# Patient Record
Sex: MALE | Race: WHITE | NOT HISPANIC OR LATINO | URBAN - METROPOLITAN AREA
[De-identification: names, ages, dates, MRNs, and addresses within clinical notes are randomized per-mention and may not be internally consistent; named-entity substitution may affect disease eponyms.]

---

## 2019-01-04 VITALS
DIASTOLIC BLOOD PRESSURE: 59 MMHG | HEART RATE: 79 BPM | OXYGEN SATURATION: 95 % | WEIGHT: 134.7 LBS | SYSTOLIC BLOOD PRESSURE: 106 MMHG | TEMPERATURE: 98 F | HEIGHT: 70 IN | RESPIRATION RATE: 18 BRPM

## 2019-01-04 NOTE — H&P ADULT - NSHPPHYSICALEXAM_GEN_ALL_CORE
MSK: + Decreased ROM 2/2 pain, lumbosacral spine      Remainder of exam per medical clearance note Gen:  Pt lethargic, A&OX3  MSK: + Decreased ROM 2/2 pain, lumbosacral spine  DP's/PT's +2  EHL/FHL/TA/Gastro 5/5  gross sensation to light touch intact throughout  b/l leg swelling R>L    Remainder of exam per medical clearance note Gen:  Pt lethargic, A&OX3  MSK: + Decreased ROM 2/2 pain, lumbosacral spine  DP's/PT's +2 b/l  EHL/FHL/TA/Gastro 5/5 b/l  gross sensation to light touch intact throughout lower extremities b/l  b/l leg swelling R>L    Remainder of exam per medical clearance note

## 2019-01-04 NOTE — H&P ADULT - PSH
Surgery, elective  clavical sx  Surgery, elective  left ankle 2012  Surgery, elective  spinal fusion x 2  lumbar

## 2019-01-04 NOTE — H&P ADULT - NSHPLABSRESULTS_GEN_ALL_CORE
Preop CBC, BMP, PT/PTT/INR, - WNL per medical clearance   UA DOS  Nasal swab DOS  Preop CXR - WNL per medical clearance   Preop EKG - NSR -  WNL per medical clearance

## 2019-01-04 NOTE — H&P ADULT - HISTORY OF PRESENT ILLNESS
53M c/o low back pain x     Pt has failed conservative treatment for his symptoms   Present for exploration of spinal fusion, revision TLIF L1-Sacrum 53M s/p Laminectomy fusion c/o low back pain x > 1 year.  Pt states back pain occasionally radiates down LE b/l but denies numbness or tingling down both lower extremities.  Pt currently takes 60mg MS Contin, 30mg oxycodone for his pain and states his pain is not well controlled.  Pt notes they have a hx of leg swelling which has been chronic in nature.  Pt ambulates independently.  Pt states they are lethargic today secondary to waking up early this morning for surgery.   Pt denies fever, chills, recent illness, CP, SOB, or being on any anticoagulation medication.    Pt has failed conservative treatment for his symptoms   Present for exploration of spinal fusion, revision TLIF L1-Sacrum with cage implantation. 53M s/p Laminectomy fusion c/o low back pain x > 1 year.  Pt states back pain occasionally radiates down LE b/l but denies numbness or tingling down both lower extremities.  Pt currently takes 60mg MS Contin, 30mg oxycodone for his pain and states his pain is not well controlled.  Pt notes they have a hx of leg swelling which has been chronic in nature.  Pt ambulates independently.  Pt states they are lethargic today secondary to waking up early this morning for surgery.   Pt denies fever, chills, recent illness, CP, SOB, or being on any anticoagulation medication.    Pt has failed conservative treatment for his symptoms   Presents for elective exploration of spinal fusion, revision TLIF L1-Sacrum with cage implantation.

## 2019-01-04 NOTE — PRE-OP CHECKLIST - 2.
pt appears sedated, wakes to voice, vs stable, pt stated took all pain meds this am..see MAR, anesthesia , Dr. Carlson, and OR nurse notified, no further instructions given.

## 2019-01-04 NOTE — H&P ADULT - PROBLEM SELECTOR PLAN 1
Admit to Orthopaedic Service.  Presents today for elective exploration of fusion, Revision TLIF L1-Sacrum  Pt medically stable and cleared for procedure today by Dr. Sweet Admit to Orthopaedic Service.  Presents today for elective exploration of fusion, Revision TLIF L1-Sacrum  Consult PM postop  Pt medically stable and cleared for procedure today by Dr. Sweet

## 2019-01-07 ENCOUNTER — INPATIENT (INPATIENT)
Facility: HOSPITAL | Age: 54
LOS: 7 days | Discharge: NURSING, REHAB OR LONG-TERM CARE | DRG: 457 | End: 2019-01-15
Payer: MEDICARE

## 2019-01-07 DIAGNOSIS — Z41.9 ENCOUNTER FOR PROCEDURE FOR PURPOSES OTHER THAN REMEDYING HEALTH STATE, UNSPECIFIED: Chronic | ICD-10-CM

## 2019-01-07 DIAGNOSIS — B19.20 UNSPECIFIED VIRAL HEPATITIS C WITHOUT HEPATIC COMA: ICD-10-CM

## 2019-01-07 DIAGNOSIS — G35 MULTIPLE SCLEROSIS: ICD-10-CM

## 2019-01-07 DIAGNOSIS — F32.9 MAJOR DEPRESSIVE DISORDER, SINGLE EPISODE, UNSPECIFIED: ICD-10-CM

## 2019-01-07 DIAGNOSIS — M48.07 SPINAL STENOSIS, LUMBOSACRAL REGION: ICD-10-CM

## 2019-01-07 LAB
MRSA PCR RESULT.: NEGATIVE — SIGNIFICANT CHANGE UP
S AUREUS DNA NOSE QL NAA+PROBE: POSITIVE

## 2019-01-07 PROCEDURE — 99222 1ST HOSP IP/OBS MODERATE 55: CPT

## 2019-01-07 RX ORDER — HYDROMORPHONE HYDROCHLORIDE 2 MG/ML
30 INJECTION INTRAMUSCULAR; INTRAVENOUS; SUBCUTANEOUS
Qty: 0 | Refills: 0 | Status: DISCONTINUED | OUTPATIENT
Start: 2019-01-07 | End: 2019-01-08

## 2019-01-07 RX ORDER — HYDROMORPHONE HYDROCHLORIDE 2 MG/ML
1 INJECTION INTRAMUSCULAR; INTRAVENOUS; SUBCUTANEOUS ONCE
Qty: 0 | Refills: 0 | Status: DISCONTINUED | OUTPATIENT
Start: 2019-01-07 | End: 2019-01-08

## 2019-01-07 RX ORDER — HYDROMORPHONE HYDROCHLORIDE 2 MG/ML
1 INJECTION INTRAMUSCULAR; INTRAVENOUS; SUBCUTANEOUS
Qty: 0 | Refills: 0 | Status: DISCONTINUED | OUTPATIENT
Start: 2019-01-07 | End: 2019-01-08

## 2019-01-07 RX ORDER — SODIUM CHLORIDE 9 MG/ML
1000 INJECTION INTRAMUSCULAR; INTRAVENOUS; SUBCUTANEOUS ONCE
Qty: 0 | Refills: 0 | Status: COMPLETED | OUTPATIENT
Start: 2019-01-07 | End: 2019-01-07

## 2019-01-07 RX ORDER — ACETAMINOPHEN 500 MG
975 TABLET ORAL EVERY 8 HOURS
Qty: 0 | Refills: 0 | Status: DISCONTINUED | OUTPATIENT
Start: 2019-01-07 | End: 2019-01-08

## 2019-01-07 RX ORDER — HYDROMORPHONE HYDROCHLORIDE 2 MG/ML
1 INJECTION INTRAMUSCULAR; INTRAVENOUS; SUBCUTANEOUS ONCE
Qty: 0 | Refills: 0 | Status: DISCONTINUED | OUTPATIENT
Start: 2019-01-07 | End: 2019-01-07

## 2019-01-07 RX ORDER — BUPIVACAINE 13.3 MG/ML
20 INJECTION, SUSPENSION, LIPOSOMAL INFILTRATION ONCE
Qty: 0 | Refills: 0 | Status: DISCONTINUED | OUTPATIENT
Start: 2019-01-07 | End: 2019-01-08

## 2019-01-07 RX ORDER — OXYCODONE HYDROCHLORIDE 5 MG/1
60 TABLET ORAL EVERY 8 HOURS
Qty: 0 | Refills: 0 | Status: DISCONTINUED | OUTPATIENT
Start: 2019-01-07 | End: 2019-01-07

## 2019-01-07 RX ORDER — NALOXONE HYDROCHLORIDE 4 MG/.1ML
0.1 SPRAY NASAL
Qty: 0 | Refills: 0 | Status: DISCONTINUED | OUTPATIENT
Start: 2019-01-07 | End: 2019-01-08

## 2019-01-07 RX ORDER — VANCOMYCIN HCL 1 G
1000 VIAL (EA) INTRAVENOUS EVERY 12 HOURS
Qty: 0 | Refills: 0 | Status: DISCONTINUED | OUTPATIENT
Start: 2019-01-07 | End: 2019-01-09

## 2019-01-07 RX ORDER — NALOXONE HYDROCHLORIDE 4 MG/.1ML
0.1 SPRAY NASAL
Qty: 0 | Refills: 0 | Status: DISCONTINUED | OUTPATIENT
Start: 2019-01-07 | End: 2019-01-07

## 2019-01-07 RX ORDER — ONDANSETRON 8 MG/1
4 TABLET, FILM COATED ORAL EVERY 6 HOURS
Qty: 0 | Refills: 0 | Status: DISCONTINUED | OUTPATIENT
Start: 2019-01-07 | End: 2019-01-08

## 2019-01-07 RX ORDER — ALPRAZOLAM 0.25 MG
0 TABLET ORAL
Qty: 0 | Refills: 0 | COMMUNITY

## 2019-01-07 RX ORDER — SODIUM CHLORIDE 9 MG/ML
1000 INJECTION, SOLUTION INTRAVENOUS
Qty: 0 | Refills: 0 | Status: DISCONTINUED | OUTPATIENT
Start: 2019-01-07 | End: 2019-01-09

## 2019-01-07 RX ORDER — HYDROMORPHONE HYDROCHLORIDE 2 MG/ML
1 INJECTION INTRAMUSCULAR; INTRAVENOUS; SUBCUTANEOUS
Qty: 0 | Refills: 0 | Status: DISCONTINUED | OUTPATIENT
Start: 2019-01-07 | End: 2019-01-07

## 2019-01-07 RX ORDER — ALPRAZOLAM 0.25 MG
2 TABLET ORAL
Qty: 0 | Refills: 0 | Status: DISCONTINUED | OUTPATIENT
Start: 2019-01-07 | End: 2019-01-08

## 2019-01-07 RX ORDER — OXYCODONE HYDROCHLORIDE 5 MG/1
30 TABLET ORAL EVERY 6 HOURS
Qty: 0 | Refills: 0 | Status: DISCONTINUED | OUTPATIENT
Start: 2019-01-07 | End: 2019-01-07

## 2019-01-07 RX ADMIN — Medication 975 MILLIGRAM(S): at 22:15

## 2019-01-07 RX ADMIN — Medication 250 MILLIGRAM(S): at 19:39

## 2019-01-07 RX ADMIN — Medication 975 MILLIGRAM(S): at 23:00

## 2019-01-07 RX ADMIN — SODIUM CHLORIDE 1000 MILLILITER(S): 9 INJECTION INTRAMUSCULAR; INTRAVENOUS; SUBCUTANEOUS at 23:38

## 2019-01-07 RX ADMIN — Medication 2 MILLIGRAM(S): at 22:16

## 2019-01-07 RX ADMIN — HYDROMORPHONE HYDROCHLORIDE 30 MILLILITER(S): 2 INJECTION INTRAMUSCULAR; INTRAVENOUS; SUBCUTANEOUS at 15:46

## 2019-01-07 RX ADMIN — HYDROMORPHONE HYDROCHLORIDE 1 MILLIGRAM(S): 2 INJECTION INTRAMUSCULAR; INTRAVENOUS; SUBCUTANEOUS at 14:47

## 2019-01-07 RX ADMIN — HYDROMORPHONE HYDROCHLORIDE 1 MILLIGRAM(S): 2 INJECTION INTRAMUSCULAR; INTRAVENOUS; SUBCUTANEOUS at 14:30

## 2019-01-07 RX ADMIN — HYDROMORPHONE HYDROCHLORIDE 1 MILLIGRAM(S): 2 INJECTION INTRAMUSCULAR; INTRAVENOUS; SUBCUTANEOUS at 14:45

## 2019-01-07 NOTE — DISCHARGE NOTE ADULT - MEDICATION SUMMARY - MEDICATIONS TO STOP TAKING
I will STOP taking the medications listed below when I get home from the hospital:    MS Contin 60 mg oral tablet, extended release  -- 1 tab(s) by mouth every 8 hours

## 2019-01-07 NOTE — DISCHARGE NOTE ADULT - PLAN OF CARE
Improve pain and function No strenuous activity, heavy lifting, driving or returning to work until cleared by MD.  You may shower - dressing is water-resistant, no soaking in bathtubs.  Remove dressing after 5 days, then leave incision open to air. Keep incision clean and dry.  Try to have regular bowel movements, take stool softener or laxative if necessary.  May take Pepcid or Zantac for upset stomach.  May take Aleve or Naproxen instead of Meloxicam.  Swelling may travel all the way down leg to foot, this is normal and will subside in a few weeks.  Call to schedule an appt with Dr. Carlson for follow up, if you have staples or sutures they will be removed in office.  Contact your doctor if you experience: fever greater than 101.5, chills, chest pain, difficulty breathing, redness or excessive drainage around the incision, other concerns. Wear lumbar support brace for ambulation  No strenuous activity (bending/twisting), heavy lifting, driving or returning to work until cleared by MD.  You may shower. Remove dressing daily before shower, pat the area dry gently then reapply dry gauze dressing x 5 days, then leave incision open to air.   Try to have regular bowel movements, take stool softener or laxative if necessary.  May take antacids for upset stomach.  Ice affected areas to decrease swelling.  Call to schedule an appt with Dr. Carlson for follow up, if you have staples or sutures they will be removed in office.  Contact your doctor if you experience: fever greater than 101.5, chills, chest pain, difficulty breathing, redness or excessive drainage around the incision, other concerns. Improved pain and lower extremity function Wear lumbar support brace for ambulation and will require supervision x2 assistance  Patient has MS and must follow up with outpatient neurologist  Patient is a chronic pain patient and must follow up with outpatient pain management Dr. Abreu Address: [address] [place] [id number] Phone: [phone]  No strenuous activity (bending/twisting), heavy lifting, driving or returning to work until cleared by MD.  You may shower. Remove dressing daily before shower, pat the area dry gently then reapply dry gauze dressing x 5 days, then leave incision open to air.   Try to have regular bowel movements, take stool softener or laxative if necessary.  May take antacids for upset stomach.  Ice affected areas to decrease swelling.  Call to schedule an appt with Dr. Carlson for follow up, if you have staples or sutures they will be removed in office.  Contact your doctor if you experience: fever greater than 101.5, chills, chest pain, difficulty breathing, redness or excessive drainage around the incision, other concerns.

## 2019-01-07 NOTE — CONSULT NOTE ADULT - SUBJECTIVE AND OBJECTIVE BOX
REASON FOR CONSULT:    HISTORY OF PRESENT ILLNESS:    PAST MEDICAL & SURGICAL HISTORY:  Depression  MS (multiple sclerosis)  Hepatitis C  Surgery, elective: clavical sx  Surgery, elective: left ankle 2012  Surgery, elective: spinal fusion x 2  lumbar      [ ] Diabetes   [ ] Hypertension  [ ] Hyperlipidemia  [ ] CAD  [ ] PCI  [ ] CABG    PREVIOUS DIAGNOSTIC TESTING:    [ ] Echocardiogram:  [ ]  Catheterization:  [ ] Stress Test:  	    MEDICATIONS:    vancomycin  IVPB 1000 milliGRAM(s) IV Intermittent every 12 hours      acetaminophen   Tablet .. 975 milliGRAM(s) Oral every 8 hours  ALPRAZolam 2 milliGRAM(s) Oral two times a day  HYDROmorphone  Injectable 1 milliGRAM(s) IV Push once PRN  HYDROmorphone  Injectable 1 milliGRAM(s) IV Push once PRN  HYDROmorphone  Injectable 1 milliGRAM(s) IV Push every 2 hours PRN  HYDROmorphone  Injectable 1 milliGRAM(s) IV Push once PRN  HYDROmorphone PCA (1 mG/mL) 30 milliLiter(s) PCA Continuous PCA Continuous  HYDROmorphone PCA (1 mG/mL) Rescue Clinician Bolus 1 milliGRAM(s) IV Push every 2 hours PRN  ondansetron Injectable 4 milliGRAM(s) IV Push every 6 hours PRN        lactated ringers. 1000 milliLiter(s) IV Continuous <Continuous>      FAMILY HISTORY:      SOCIAL HISTORY:    [ x] Non-smoker  [ ] Smoker  [ ] Alcohol    FAMILY HX: [place]    Allergies    No Known Allergies    Intolerances    erythromycin (Stomach Upset)  	    REVIEW OF SYSTEMS:    [x] as per HPI  CONSTITUTIONAL: No fever, weight loss, or fatigue  ENT:  No difficulty hearing, tinnitus, vertigo; No sinus or throat pain  RESPIRATORY: No cough, wheezing, chills or hemoptysis; No Shortness of Breath  CARDIOVASCULAR: No chest pain, palpitations, dizziness, or leg swelling  GASTROINTESTINAL: No abdominal or epigastric pain. No nausea, vomiting, or hematemesis; No diarrhea or constipation. No melena or hematochezia.  GENITOURINARY: No dysuria, frequency, hematuria, or incontinence  NEUROLOGICAL: No headaches, memory loss, loss of strength, numbness, or tremors  MUSCULOSKELETAL: No joint pain or swelling; No muscle, back, or extremity pain  [x] All others negative	  [ ] Unable to obtain    PHYSICAL EXAM:  T(C): 35.8 (19 @ 13:56), Max: 35.8 (19 @ 13:56)  HR: 104 (19 @ 15:56) (80 - 104)  BP: 109/82 (19 @ 14:11) (109/82 - 134/91)  RR: 9 (19 @ 15:56) (7 - 43)  SpO2: 98% (19 @ 15:56) (85% - 100%)  Wt(kg): --  I&O's Summary      Appearance: Normal	  HEENT:   Normal oral mucosa, PERRL, EOMI	  Lymphatic: No lymphadenopathy  Cardiovascular: Normal S1 S2, No JVD, No murmurs, No edema  Respiratory: Lungs clear to auscultation	  Psychiatry: A & O x 3, Mood & affect appropriate  Gastrointestinal:  Soft, Non-tender, + BS	  Skin: No rashes, No ecchymoses, No cyanosis	  Neurologic: Non-focal  Extremities: Normal range of motion, No clubbing, cyanosis or edema  Vascular: Peripheral pulses palpable 2+ bilaterally    TELEMETRY: 	    ECG:    ECHO:   STRESS:  CATH:  	  RADIOLOGY:  CXR:  CT:  US:   	  	  LABS:	 	    CARDIAC MARKERS:                    proBNP:   Lipid Profile:   HgA1c:   TSH:     ASSESSMENT/PLAN: 	    #r/o endocarditis - will base Rx on modified dukes criteria  Hep C, IVDU  please obtain the followin sets of blood cultures under sterile technique  CBC with manual differential  ESR, CRP  12 lead ECG  Echo - transthoracic ( no need for KRISTINE yet)  ID consultation

## 2019-01-07 NOTE — DISCHARGE NOTE ADULT - CARE PLAN
Principal Discharge DX:	Spinal stenosis of lumbosacral region  Goal:	Improve pain and function  Assessment and plan of treatment:	No strenuous activity, heavy lifting, driving or returning to work until cleared by MD.  You may shower - dressing is water-resistant, no soaking in bathtubs.  Remove dressing after 5 days, then leave incision open to air. Keep incision clean and dry.  Try to have regular bowel movements, take stool softener or laxative if necessary.  May take Pepcid or Zantac for upset stomach.  May take Aleve or Naproxen instead of Meloxicam.  Swelling may travel all the way down leg to foot, this is normal and will subside in a few weeks.  Call to schedule an appt with Dr. Carlson for follow up, if you have staples or sutures they will be removed in office.  Contact your doctor if you experience: fever greater than 101.5, chills, chest pain, difficulty breathing, redness or excessive drainage around the incision, other concerns. Principal Discharge DX:	Spinal stenosis of lumbosacral region  Goal:	Improve pain and function  Assessment and plan of treatment:	Wear lumbar support brace for ambulation  No strenuous activity (bending/twisting), heavy lifting, driving or returning to work until cleared by MD.  You may shower. Remove dressing daily before shower, pat the area dry gently then reapply dry gauze dressing x 5 days, then leave incision open to air.   Try to have regular bowel movements, take stool softener or laxative if necessary.  May take antacids for upset stomach.  Ice affected areas to decrease swelling.  Call to schedule an appt with Dr. Carlson for follow up, if you have staples or sutures they will be removed in office.  Contact your doctor if you experience: fever greater than 101.5, chills, chest pain, difficulty breathing, redness or excessive drainage around the incision, other concerns. Principal Discharge DX:	Spinal stenosis of lumbosacral region  Goal:	Improved pain and lower extremity function  Assessment and plan of treatment:	Wear lumbar support brace for ambulation and will require supervision x2 assistance  Patient has MS and must follow up with outpatient neurologist  Patient is a chronic pain patient and must follow up with outpatient pain management Dr. Abreu Address: [facility] [address] [place], [place] [address] Phone: [phone]  No strenuous activity (bending/twisting), heavy lifting, driving or returning to work until cleared by MD.  You may shower. Remove dressing daily before shower, pat the area dry gently then reapply dry gauze dressing x 5 days, then leave incision open to air.   Try to have regular bowel movements, take stool softener or laxative if necessary.  May take antacids for upset stomach.  Ice affected areas to decrease swelling.  Call to schedule an appt with Dr. Carlson for follow up, if you have staples or sutures they will be removed in office.  Contact your doctor if you experience: fever greater than 101.5, chills, chest pain, difficulty breathing, redness or excessive drainage around the incision, other concerns.

## 2019-01-07 NOTE — BRIEF OPERATIVE NOTE - POST-OP DX
Other kyphosis of thoracolumbar region  01/07/2019    Active  Isrrael, Mikail  Spinal fusion failure, sequela  01/07/2019    Active  Isrrael, Mikail  Spinal stenosis of lumbar region, unspecified whether neurogenic claudication present  01/07/2019    Active  Isrrael, Mikail

## 2019-01-07 NOTE — PROGRESS NOTE ADULT - SUBJECTIVE AND OBJECTIVE BOX
[facility] [phone]    Chief Complaint: Lower Back Pain    HPI: Patient seen and examined in PACU. Patient laying down in bed, complaining of lower back and incisional pain. Patient sees Dr. Roosevelt Abreu and takes Oxycontin 60mg PO TID, Oxycodone 30mg PO 4-5 times a day prn pain and Xanax 2mg PO BID, verified with [facility] pharmacy. Discussed plan to start Dilaudid PCA, reviewed Dilaudid PCA        Pain is ___ sharp _x___dull ___burning _x__achy ___ Intensity: ____ mild _x__mod _x__severe     Location _x___surgical site ____cervical __x___lumbar ____abd ____upper ext____lower ext    Worse with __x__activity __x__movement _____physical therapy___ Rest    Improved with __x__medication __x__rest ____physical therapy      ROS: Const:  _-__febrile   Eyes:___ENT:___CV: _-__chest pain  Resp: __-__sob  GI:_-__nausea _-__vomiting _-__abd pain ___npo ___clears x__full diet __bm  :___ Musk: _x__pain _x__spasm  Skin:___ Neuro:  _-__ubtxwrbp_-__tsffakplq_-__ numbness _x__weakness _-__paresth  Psych:-__anxiety  Endo:___ Heme:___Allergy:_________, _x__all others reviewed and negative      PAST MEDICAL & SURGICAL HISTORY:  Depression  MS (multiple sclerosis)  Hepatitis C  Surgery, elective: clavical sx  Surgery, elective: left ankle 2012  Surgery, elective: spinal fusion x 2  lumbar  Depression  COPD (chronic obstructive pulmonary disease)  MS (multiple sclerosis)  Hepatitis C  Spinal stenosis of lumbar region, unspecified whether neurogenic claudication present  Spinal fusion failure, sequela  Other kyphosis of thoracolumbar region  Spinal fusion failure, sequela  Spinal stenosis of lumbar region, unspecified whether neurogenic claudication present  Other kyphosis of thoracolumbar region  Laminectomy of lumbar spine 3 or more levels with exploration or decompression or both  Spinal stenosis of lumbosacral region  MS (multiple sclerosis)  Depression  Hepatitis C  Spinal stenosis of lumbosacral region  Laminectomy of lumbar spine 3 or more levels with exploration or decompression or both  Surgery, elective    SH: _-__Tobacco   __-_Alcohol                          FH:FAMILY HISTORY:      acetaminophen   Tablet .. 975 milliGRAM(s) Oral every 8 hours  ALPRAZolam 2 milliGRAM(s) Oral two times a day  BUpivacaine liposome 1.3% Injectable (no eMAR) 20 milliLiter(s) Local Injection once  HYDROmorphone  Injectable 1 milliGRAM(s) IV Push once PRN  HYDROmorphone  Injectable 1 milliGRAM(s) IV Push once PRN  HYDROmorphone  Injectable 1 milliGRAM(s) IV Push every 2 hours PRN  HYDROmorphone  Injectable 1 milliGRAM(s) IV Push once PRN  HYDROmorphone PCA (1 mG/mL) 30 milliLiter(s) PCA Continuous PCA Continuous  HYDROmorphone PCA (1 mG/mL) Rescue Clinician Bolus 1 milliGRAM(s) IV Push every 2 hours PRN  lactated ringers. 1000 milliLiter(s) IV Continuous <Continuous>  naloxone Injectable 0.1 milliGRAM(s) IV Push every 3 minutes PRN  ondansetron Injectable 4 milliGRAM(s) IV Push every 6 hours PRN  vancomycin  IVPB 1000 milliGRAM(s) IV Intermittent every 12 hours      T(C): 35.8 (01-07-19 @ 13:56), Max: 35.8 (01-07-19 @ 13:56)  HR: 100 (01-07-19 @ 14:56) (80 - 100)  BP: 109/82 (01-07-19 @ 14:11) (109/82 - 134/91)  RR: 43 (01-07-19 @ 14:56) (7 - 43)  SpO2: 94% (01-07-19 @ 14:56) (85% - 100%)  Wt(kg): --    T(C): 35.8 (01-07-19 @ 13:56), Max: 35.8 (01-07-19 @ 13:56)  HR: 100 (01-07-19 @ 14:56) (80 - 100)  BP: 109/82 (01-07-19 @ 14:11) (109/82 - 134/91)  RR: 43 (01-07-19 @ 14:56) (7 - 43)  SpO2: 94% (01-07-19 @ 14:56) (85% - 100%)  Wt(kg): --    T(C): 35.8 (01-07-19 @ 13:56), Max: 35.8 (01-07-19 @ 13:56)  HR: 100 (01-07-19 @ 14:56) (80 - 100)  BP: 109/82 (01-07-19 @ 14:11) (109/82 - 134/91)  RR: 43 (01-07-19 @ 14:56) (7 - 43)  SpO2: 94% (01-07-19 @ 14:56) (85% - 100%)  Wt(kg): --      PHYSICAL EXAM:  Gen Appearance: _x__no acute distress __x_appropriate        Neuro: ___SILT feet____ EOM Intact Psych: AAOX_3_, _x__mood/affect appropriate        Eyes: _x__conjunctiva WNL  __x___ Pupils equal and round        ENT: __x_ears and nose atraumatic_x__ Hearing grossly intact        Neck: _x__trachea midline, no visible masses ___thyroid without palpable mass    Resp: _x__Nml WOB____No tactile fremitus ___clear to auscultation    Cardio: _x__extremities free from edema __x__pedal pulses palpable    GI/Abdomen: _x__soft __x___ Nontender____x__Nondistended_____HSM    Lymphatic: ___no palpable nodes in neck  ___no palpable nodes calves and feet    Skin/Wound: _x__Incision, _x__Dressing c/d/i,   _x___surrounding tissues soft,  ___drain/chest tube present____    Muscular: EHL _4__/5  Gastrocnemius_4__/5    ___absent clubbing/cyanosis        ASSESSMENT: This is a 53y old Male with a history of spinal stenosis and spinal fusion, s/p laminectomy fusion, post op day 0.       Recommended Treatment PLAN:  1. Dilaudid PCA 0.3mg, Q6 minutes 16mg 4 hour max   2. Dilaudid 1mg Q1H IVP prn breakthrough pain   3. Tylenol 975mg PO Q8h standing  4. Consider escalating Dilaudid PCA 0.4mg demand dose if necessary  Plan discussed with Vicente Feliz Pain Management Consult [facility] [phone]    Chief Complaint: Lower Back Pain    HPI: Patient seen and examined in PACU. Patient laying down in bed, complaining of lower back and incisional pain. Patient sees Dr. Roosevelt Abreu and takes Oxycontin 60mg PO TID, Oxycodone 30mg PO 4-5 times a day prn pain and Xanax 2mg PO BID, verified with [facility] pharmacy and [facility]. Discussed plan to start Dilaudid PCA postop, reviewed Dilaudid PCA use with patient, Patient verbalized understanding.         Pain is ___ sharp _x___dull ___burning _x__achy ___ Intensity: ____ mild _x__mod _x__severe     Location _x___surgical site ____cervical __x___lumbar ____abd ____upper ext____lower ext    Worse with __x__activity __x__movement _____physical therapy___ Rest    Improved with __x__medication __x__rest ____physical therapy      ROS: Const:  _-__febrile   Eyes:___ENT:___CV: _-__chest pain  Resp: __-__sob  GI:_-__nausea _-__vomiting _-__abd pain ___npo ___clears x__full diet __bm  :___ Musk: _x__pain _x__spasm  Skin:___ Neuro:  _-__pmzclnjg_-__hcaqafswz_-__ numbness _x__weakness _-__paresth  Psych:-__anxiety  Endo:___ Heme:___Allergy:_________, _x__all others reviewed and negative      PAST MEDICAL & SURGICAL HISTORY:  Depression  MS (multiple sclerosis)  Hepatitis C  Surgery, elective: clavical sx  Surgery, elective: left ankle 2012  Surgery, elective: spinal fusion x 2  lumbar  Depression  COPD (chronic obstructive pulmonary disease)  MS (multiple sclerosis)  Hepatitis C  Spinal stenosis of lumbar region, unspecified whether neurogenic claudication present  Spinal fusion failure, sequela  Other kyphosis of thoracolumbar region  Spinal fusion failure, sequela  Spinal stenosis of lumbar region, unspecified whether neurogenic claudication present  Other kyphosis of thoracolumbar region  Laminectomy of lumbar spine 3 or more levels with exploration or decompression or both  Spinal stenosis of lumbosacral region  MS (multiple sclerosis)  Depression  Hepatitis C  Spinal stenosis of lumbosacral region  Laminectomy of lumbar spine 3 or more levels with exploration or decompression or both  Surgery, elective    SH: _-__Tobacco   __-_Alcohol                          FH:FAMILY HISTORY:      acetaminophen   Tablet .. 975 milliGRAM(s) Oral every 8 hours  ALPRAZolam 2 milliGRAM(s) Oral two times a day  BUpivacaine liposome 1.3% Injectable (no eMAR) 20 milliLiter(s) Local Injection once  HYDROmorphone  Injectable 1 milliGRAM(s) IV Push once PRN  HYDROmorphone  Injectable 1 milliGRAM(s) IV Push once PRN  HYDROmorphone  Injectable 1 milliGRAM(s) IV Push every 2 hours PRN  HYDROmorphone  Injectable 1 milliGRAM(s) IV Push once PRN  HYDROmorphone PCA (1 mG/mL) 30 milliLiter(s) PCA Continuous PCA Continuous  HYDROmorphone PCA (1 mG/mL) Rescue Clinician Bolus 1 milliGRAM(s) IV Push every 2 hours PRN  lactated ringers. 1000 milliLiter(s) IV Continuous <Continuous>  naloxone Injectable 0.1 milliGRAM(s) IV Push every 3 minutes PRN  ondansetron Injectable 4 milliGRAM(s) IV Push every 6 hours PRN  vancomycin  IVPB 1000 milliGRAM(s) IV Intermittent every 12 hours      T(C): 35.8 (01-07-19 @ 13:56), Max: 35.8 (01-07-19 @ 13:56)  HR: 100 (01-07-19 @ 14:56) (80 - 100)  BP: 109/82 (01-07-19 @ 14:11) (109/82 - 134/91)  RR: 43 (01-07-19 @ 14:56) (7 - 43)  SpO2: 94% (01-07-19 @ 14:56) (85% - 100%)  Wt(kg): --    T(C): 35.8 (01-07-19 @ 13:56), Max: 35.8 (01-07-19 @ 13:56)  HR: 100 (01-07-19 @ 14:56) (80 - 100)  BP: 109/82 (01-07-19 @ 14:11) (109/82 - 134/91)  RR: 43 (01-07-19 @ 14:56) (7 - 43)  SpO2: 94% (01-07-19 @ 14:56) (85% - 100%)  Wt(kg): --    T(C): 35.8 (01-07-19 @ 13:56), Max: 35.8 (01-07-19 @ 13:56)  HR: 100 (01-07-19 @ 14:56) (80 - 100)  BP: 109/82 (01-07-19 @ 14:11) (109/82 - 134/91)  RR: 43 (01-07-19 @ 14:56) (7 - 43)  SpO2: 94% (01-07-19 @ 14:56) (85% - 100%)  Wt(kg): --      PHYSICAL EXAM:  Gen Appearance: _x__no acute distress __x_appropriate        Neuro: ___SILT feet____ EOM Intact Psych: AAOX_3_, _x__mood/affect appropriate        Eyes: _x__conjunctiva WNL  __x___ Pupils equal and round        ENT: __x_ears and nose atraumatic_x__ Hearing grossly intact        Neck: _x__trachea midline, no visible masses ___thyroid without palpable mass    Resp: _x__Nml WOB____No tactile fremitus ___clear to auscultation    Cardio: _x__extremities free from edema __x__pedal pulses palpable    GI/Abdomen: _x__soft __x___ Nontender____x__Nondistended_____HSM    Lymphatic: ___no palpable nodes in neck  ___no palpable nodes calves and feet    Skin/Wound: _x__Incision, _x__Dressing c/d/i,   _x___surrounding tissues soft,  ___drain/chest tube present____    Muscular: EHL _4__/5  Gastrocnemius_4__/5    ___absent clubbing/cyanosis        ASSESSMENT: This is a 53y old Male with a history of spinal stenosis and spinal fusion, s/p laminectomy fusion, post op day 0.       Recommended Treatment PLAN:  1. Dilaudid PCA 0.3mg, Q6 minutes 16mg 4 hour max   2. Dilaudid 1mg Q1H IVP prn breakthrough pain   3. Tylenol 975mg PO Q8h standing  4. Consider escalating Dilaudid PCA 0.4mg demand dose if necessary  Plan discussed with Vicente Feliz

## 2019-01-07 NOTE — DISCHARGE NOTE ADULT - PATIENT PORTAL LINK FT
You can access the [email or web address] Patient Portal, offered by [facility], by registering with the following website: [email or web address]

## 2019-01-07 NOTE — DISCHARGE NOTE ADULT - REASON FOR ADMISSION
low back pain low back pain/ OR 1/8/19: Removal of hardware, exploration of fusion, revision laminectomy, primary laminectomy L2-L4, re-instrumentation of posterior spinal fusion L1-sacrum

## 2019-01-07 NOTE — BRIEF OPERATIVE NOTE - PROCEDURE
<<-----Click on this checkbox to enter Procedure Laminectomy of lumbar spine 3 or more levels with exploration or decompression or both  01/07/2019  Removal of hardware, exploration of fusion, revision laminectomy, primary laminectomy L2-L4, reinstrumentation of posterior spinal fusion L1-sacrum  Active  MKOROMA

## 2019-01-07 NOTE — PROGRESS NOTE ADULT - SUBJECTIVE AND OBJECTIVE BOX
Orthopaedics Post Op Check    Procedure: Revision TLIF L1-Sacrum  Surgeon: Dr. Carlson    Pt agitated. Complaining of pain unrelieved by medications and position of his bed. Pt insists that he sit in a chair while PACU staff continues to encourage that this is not a safe option at this time. Pain management at the bedside - patient has required multiple doses of dilaudid in the PACU while waiting for PCA. Patient home dose of xanax to be ordered which has been approved by Dr. Carlson.     Denies CP, SOB, N/V, numbness/tingling       AVSS    Dressing C/D/I; HV x 2   General: Pt Alert and oriented     Pulses: DP pulses palpable   Sensation: SLT in tact and equal to distal bilateral lower extremities   Motor: EHL/FHL/TA/GS 5/5 motor strength bilaterally   Right lower extremity swelling globally      Post op XR: fluoroscopy utilized intra operatively to confirm level    A/P: 53yMale POD#0 s/p Revision TLIF L1-Sacrum  - Stable  - Pain Control  - DVT ppx: SCDs  - Post op abx: Vanco standing (until Cardiology consult)  - PT, WBS: WBAT  - F/U AM Labs  - Pain management recs appreciated   - F/U Bilateral lower extremity dopplers

## 2019-01-07 NOTE — DISCHARGE NOTE ADULT - MEDICATION SUMMARY - MEDICATIONS TO TAKE
I will START or STAY ON the medications listed below when I get home from the hospital:    oxyCODONE 30 mg oral tablet  -- 1 tab(s) by mouth every 4 hours, As needed, Severe Pain (7 - 10)  -- Indication: For Short acting pain control (chronic pain patient recommended by pain mngt MD)    oxyCODONE 80 mg oral tablet, extended release  -- 1 tab(s) by mouth every 8 hours  -- Indication: For long acting pain control (chronic pain patient recommended by pain mngt MD)    Xanax 2 mg oral tablet  -- orally 2 times a day  -- Indication: For Home medication    famotidine 20 mg oral tablet  -- 1 tab(s) by mouth once a day  -- Indication: For gastrointestinal agent    bisacodyl 5 mg oral delayed release tablet  -- 1 tab(s) by mouth every 12 hours, As needed, Constipation  -- Indication: For constipation    docusate sodium 100 mg oral capsule  -- 1 cap(s) by mouth 3 times a day  -- Indication: For constipation    polyethylene glycol 3350 oral powder for reconstitution  -- 17 gram(s) by mouth once a day  -- Indication: For constipation    senna oral tablet  -- 2 tab(s) by mouth once a day (at bedtime)  -- Indication: For constipation    nicotine 7 mg/24 hr transdermal film, extended release  --  by transdermal patch one patch daily  -- Indication: For Smoking cessation

## 2019-01-07 NOTE — DISCHARGE NOTE ADULT - HOSPITAL COURSE
Admit to Orthopedics   OR for Revision TLIF L1-pelvis   Periop Antibx  DVT ppx- SCDs  PT / WBAT   Pain mgt consult for pain control

## 2019-01-07 NOTE — DISCHARGE NOTE ADULT - CARE PROVIDER_API CALL
Sagar Carlson), Orthopaedic Surgery  [address]  [address] [place] [address]  Phone: [phone]  Fax: [phone]

## 2019-01-07 NOTE — DISCHARGE NOTE ADULT - ADDITIONAL INSTRUCTIONS
Follow up with your primary care provider Follow up with your primary care provider, pain management provider Dr. Abreu and neurologist for MS management

## 2019-01-07 NOTE — DISCHARGE NOTE ADULT - THE PATIENT HAS
difficulty decision making difficulty decision making/no difficulties no difficulties/difficulty concentrating

## 2019-01-07 NOTE — CONSULT NOTE ADULT - SUBJECTIVE AND OBJECTIVE BOX
Pain Management Consult Note - [facility] [phone]    Chief Complaint: Lower Back Pain    HPI: Patient seen and examined in PACU. Patient laying down in bed, complaining of lower back and incisional pain. Patient sees Dr. Roosevelt Abreu and takes Oxycontin 60mg PO TID, Oxycodone 30mg PO 4-5 times a day prn pain and Xanax 2mg PO BID, verified with [facility] pharmacy and [facility]. Discussed plan to start Dilaudid PCA postop, reviewed Dilaudid PCA use with patient, Patient verbalized understanding.         Pain is ___ sharp _x___dull ___burning _x__achy ___ Intensity: ____ mild _x__mod _x__severe     Location _x___surgical site ____cervical __x___lumbar ____abd ____upper ext____lower ext    Worse with __x__activity __x__movement _____physical therapy___ Rest    Improved with __x__medication __x__rest ____physical therapy      ROS: Const:  _-__febrile   Eyes:___ENT:___CV: _-__chest pain  Resp: __-__sob  GI:_-__nausea _-__vomiting _-__abd pain ___npo ___clears x__full diet __bm  :___ Musk: _x__pain _x__spasm  Skin:___ Neuro:  _-__ubotovct_-__qjbtasxxm_-__ numbness _x__weakness _-__paresth  Psych:-__anxiety  Endo:___ Heme:___Allergy:_________, _x__all others reviewed and negative      PAST MEDICAL & SURGICAL HISTORY:  Depression  MS (multiple sclerosis)  Hepatitis C  Surgery, elective: clavical sx  Surgery, elective: left ankle 2012  Surgery, elective: spinal fusion x 2  lumbar  Depression  COPD (chronic obstructive pulmonary disease)  MS (multiple sclerosis)  Hepatitis C  Spinal stenosis of lumbar region, unspecified whether neurogenic claudication present  Spinal fusion failure, sequela  Other kyphosis of thoracolumbar region  Spinal fusion failure, sequela  Spinal stenosis of lumbar region, unspecified whether neurogenic claudication present  Other kyphosis of thoracolumbar region  Laminectomy of lumbar spine 3 or more levels with exploration or decompression or both  Spinal stenosis of lumbosacral region  MS (multiple sclerosis)  Depression  Hepatitis C  Spinal stenosis of lumbosacral region  Laminectomy of lumbar spine 3 or more levels with exploration or decompression or both  Surgery, elective    SH: _-__Tobacco   __-_Alcohol                          FH:FAMILY HISTORY:      acetaminophen   Tablet .. 975 milliGRAM(s) Oral every 8 hours  ALPRAZolam 2 milliGRAM(s) Oral two times a day  BUpivacaine liposome 1.3% Injectable (no eMAR) 20 milliLiter(s) Local Injection once  HYDROmorphone  Injectable 1 milliGRAM(s) IV Push once PRN  HYDROmorphone  Injectable 1 milliGRAM(s) IV Push once PRN  HYDROmorphone  Injectable 1 milliGRAM(s) IV Push every 2 hours PRN  HYDROmorphone  Injectable 1 milliGRAM(s) IV Push once PRN  HYDROmorphone PCA (1 mG/mL) 30 milliLiter(s) PCA Continuous PCA Continuous  HYDROmorphone PCA (1 mG/mL) Rescue Clinician Bolus 1 milliGRAM(s) IV Push every 2 hours PRN  lactated ringers. 1000 milliLiter(s) IV Continuous <Continuous>  naloxone Injectable 0.1 milliGRAM(s) IV Push every 3 minutes PRN  ondansetron Injectable 4 milliGRAM(s) IV Push every 6 hours PRN  vancomycin  IVPB 1000 milliGRAM(s) IV Intermittent every 12 hours      T(C): 35.8 (01-07-19 @ 13:56), Max: 35.8 (01-07-19 @ 13:56)  HR: 100 (01-07-19 @ 14:56) (80 - 100)  BP: 109/82 (01-07-19 @ 14:11) (109/82 - 134/91)  RR: 43 (01-07-19 @ 14:56) (7 - 43)  SpO2: 94% (01-07-19 @ 14:56) (85% - 100%)  Wt(kg): --    T(C): 35.8 (01-07-19 @ 13:56), Max: 35.8 (01-07-19 @ 13:56)  HR: 100 (01-07-19 @ 14:56) (80 - 100)  BP: 109/82 (01-07-19 @ 14:11) (109/82 - 134/91)  RR: 43 (01-07-19 @ 14:56) (7 - 43)  SpO2: 94% (01-07-19 @ 14:56) (85% - 100%)  Wt(kg): --    T(C): 35.8 (01-07-19 @ 13:56), Max: 35.8 (01-07-19 @ 13:56)  HR: 100 (01-07-19 @ 14:56) (80 - 100)  BP: 109/82 (01-07-19 @ 14:11) (109/82 - 134/91)  RR: 43 (01-07-19 @ 14:56) (7 - 43)  SpO2: 94% (01-07-19 @ 14:56) (85% - 100%)  Wt(kg): --      PHYSICAL EXAM:  Gen Appearance: _x__no acute distress __x_appropriate        Neuro: ___SILT feet____ EOM Intact Psych: AAOX_3_, _x__mood/affect appropriate        Eyes: _x__conjunctiva WNL  __x___ Pupils equal and round        ENT: __x_ears and nose atraumatic_x__ Hearing grossly intact        Neck: _x__trachea midline, no visible masses ___thyroid without palpable mass    Resp: _x__Nml WOB____No tactile fremitus ___clear to auscultation    Cardio: _x__extremities free from edema __x__pedal pulses palpable    GI/Abdomen: _x__soft __x___ Nontender____x__Nondistended_____HSM    Lymphatic: ___no palpable nodes in neck  ___no palpable nodes calves and feet    Skin/Wound: _x__Incision, _x__Dressing c/d/i,   _x___surrounding tissues soft,  ___drain/chest tube present____    Muscular: EHL _4__/5  Gastrocnemius_4__/5    ___absent clubbing/cyanosis        ASSESSMENT: This is a 53y old Male with a history of spinal stenosis and spinal fusion, s/p laminectomy fusion, post op day 0.       Recommended Treatment PLAN:  1. Dilaudid PCA 0.3mg, Q6 minutes 16mg 4 hour max   2. Dilaudid 1mg Q1H IVP prn breakthrough pain   3. Tylenol 975mg PO Q8h standing  4. Consider escalating Dilaudid PCA 0.4mg demand dose if necessary  Plan discussed with Vicente Feliz

## 2019-01-07 NOTE — DISCHARGE NOTE ADULT - NS AS ACTIVITY OBS
Do not drive or operate machinery/No Heavy lifting/straining/Showering allowed Showering allowed/No Heavy lifting/straining/Do not make important decisions/Do not drive or operate machinery

## 2019-01-08 LAB
ALBUMIN SERPL ELPH-MCNC: 2.8 G/DL — LOW (ref 3.3–5)
ALP SERPL-CCNC: 52 U/L — SIGNIFICANT CHANGE UP (ref 40–120)
ALT FLD-CCNC: 20 U/L — SIGNIFICANT CHANGE UP (ref 10–45)
ANION GAP SERPL CALC-SCNC: 9 MMOL/L — SIGNIFICANT CHANGE UP (ref 5–17)
ANION GAP SERPL CALC-SCNC: 9 MMOL/L — SIGNIFICANT CHANGE UP (ref 5–17)
APPEARANCE UR: CLEAR — SIGNIFICANT CHANGE UP
AST SERPL-CCNC: 27 U/L — SIGNIFICANT CHANGE UP (ref 10–40)
BASE EXCESS BLDA CALC-SCNC: 2.6 MMOL/L — SIGNIFICANT CHANGE UP (ref -2–3)
BASOPHILS NFR BLD AUTO: 0.3 % — SIGNIFICANT CHANGE UP (ref 0–2)
BILIRUB SERPL-MCNC: 0.7 MG/DL — SIGNIFICANT CHANGE UP (ref 0.2–1.2)
BILIRUB UR-MCNC: NEGATIVE — SIGNIFICANT CHANGE UP
BLD GP AB SCN SERPL QL: NEGATIVE — SIGNIFICANT CHANGE UP
BLD GP AB SCN SERPL QL: NEGATIVE — SIGNIFICANT CHANGE UP
BUN SERPL-MCNC: 7 MG/DL — SIGNIFICANT CHANGE UP (ref 7–23)
BUN SERPL-MCNC: 7 MG/DL — SIGNIFICANT CHANGE UP (ref 7–23)
CALCIUM SERPL-MCNC: 7.7 MG/DL — LOW (ref 8.4–10.5)
CALCIUM SERPL-MCNC: 8.1 MG/DL — LOW (ref 8.4–10.5)
CHLORIDE SERPL-SCNC: 103 MMOL/L — SIGNIFICANT CHANGE UP (ref 96–108)
CHLORIDE SERPL-SCNC: 103 MMOL/L — SIGNIFICANT CHANGE UP (ref 96–108)
CK MB CFR SERPL CALC: 13.1 NG/ML — HIGH (ref 0–6.7)
CK SERPL-CCNC: 1092 U/L — HIGH (ref 30–200)
CO2 SERPL-SCNC: 24 MMOL/L — SIGNIFICANT CHANGE UP (ref 22–31)
CO2 SERPL-SCNC: 27 MMOL/L — SIGNIFICANT CHANGE UP (ref 22–31)
COLOR SPEC: YELLOW — SIGNIFICANT CHANGE UP
CREAT SERPL-MCNC: 0.69 MG/DL — SIGNIFICANT CHANGE UP (ref 0.5–1.3)
CREAT SERPL-MCNC: 0.76 MG/DL — SIGNIFICANT CHANGE UP (ref 0.5–1.3)
DIFF PNL FLD: ABNORMAL
EOSINOPHIL NFR BLD AUTO: 1 % — SIGNIFICANT CHANGE UP (ref 0–6)
EXTRA SST TUBE: SIGNIFICANT CHANGE UP
GLUCOSE BLDC GLUCOMTR-MCNC: 115 MG/DL — HIGH (ref 70–99)
GLUCOSE SERPL-MCNC: 118 MG/DL — HIGH (ref 70–99)
GLUCOSE SERPL-MCNC: 142 MG/DL — HIGH (ref 70–99)
GLUCOSE UR QL: NEGATIVE — SIGNIFICANT CHANGE UP
HCO3 BLDA-SCNC: 26 MMOL/L — SIGNIFICANT CHANGE UP (ref 21–28)
HCT VFR BLD CALC: 33 % — LOW (ref 39–50)
HCT VFR BLD CALC: 35.4 % — LOW (ref 39–50)
HCT VFR BLD CALC: 35.6 % — LOW (ref 39–50)
HGB BLD-MCNC: 11.2 G/DL — LOW (ref 13–17)
HGB BLD-MCNC: 11.8 G/DL — LOW (ref 13–17)
HGB BLD-MCNC: 12.1 G/DL — LOW (ref 13–17)
KETONES UR-MCNC: NEGATIVE — SIGNIFICANT CHANGE UP
LACTATE SERPL-SCNC: 2 MMOL/L — SIGNIFICANT CHANGE UP (ref 0.5–2)
LEUKOCYTE ESTERASE UR-ACNC: NEGATIVE — SIGNIFICANT CHANGE UP
LYMPHOCYTES # BLD AUTO: 14.8 % — SIGNIFICANT CHANGE UP (ref 13–44)
MAGNESIUM SERPL-MCNC: 1.3 MG/DL — LOW (ref 1.6–2.6)
MAGNESIUM SERPL-MCNC: 2 MG/DL — SIGNIFICANT CHANGE UP (ref 1.6–2.6)
MCHC RBC-ENTMCNC: 30.7 PG — SIGNIFICANT CHANGE UP (ref 27–34)
MCHC RBC-ENTMCNC: 31.8 PG — SIGNIFICANT CHANGE UP (ref 27–34)
MCHC RBC-ENTMCNC: 31.8 PG — SIGNIFICANT CHANGE UP (ref 27–34)
MCHC RBC-ENTMCNC: 33.1 G/DL — SIGNIFICANT CHANGE UP (ref 32–36)
MCHC RBC-ENTMCNC: 33.9 G/DL — SIGNIFICANT CHANGE UP (ref 32–36)
MCHC RBC-ENTMCNC: 34.2 G/DL — SIGNIFICANT CHANGE UP (ref 32–36)
MCV RBC AUTO: 92.7 FL — SIGNIFICANT CHANGE UP (ref 80–100)
MCV RBC AUTO: 92.9 FL — SIGNIFICANT CHANGE UP (ref 80–100)
MCV RBC AUTO: 93.8 FL — SIGNIFICANT CHANGE UP (ref 80–100)
MONOCYTES NFR BLD AUTO: 11 % — SIGNIFICANT CHANGE UP (ref 2–14)
NEUTROPHILS NFR BLD AUTO: 72.9 % — SIGNIFICANT CHANGE UP (ref 43–77)
NITRITE UR-MCNC: NEGATIVE — SIGNIFICANT CHANGE UP
PCO2 BLDA: 34 MMHG — LOW (ref 35–48)
PH BLDA: 7.5 — HIGH (ref 7.35–7.45)
PH UR: 7 — SIGNIFICANT CHANGE UP (ref 5–8)
PHOSPHATE SERPL-MCNC: 2.3 MG/DL — LOW (ref 2.5–4.5)
PHOSPHATE SERPL-MCNC: 2.6 MG/DL — SIGNIFICANT CHANGE UP (ref 2.5–4.5)
PLATELET # BLD AUTO: 132 K/UL — LOW (ref 150–400)
PLATELET # BLD AUTO: 147 K/UL — LOW (ref 150–400)
PLATELET # BLD AUTO: 160 K/UL — SIGNIFICANT CHANGE UP (ref 150–400)
PO2 BLDA: 70 MMHG — LOW (ref 83–108)
POTASSIUM SERPL-MCNC: 4 MMOL/L — SIGNIFICANT CHANGE UP (ref 3.5–5.3)
POTASSIUM SERPL-MCNC: 4.3 MMOL/L — SIGNIFICANT CHANGE UP (ref 3.5–5.3)
POTASSIUM SERPL-SCNC: 4 MMOL/L — SIGNIFICANT CHANGE UP (ref 3.5–5.3)
POTASSIUM SERPL-SCNC: 4.3 MMOL/L — SIGNIFICANT CHANGE UP (ref 3.5–5.3)
PROT SERPL-MCNC: 5 G/DL — LOW (ref 6–8.3)
PROT UR-MCNC: NEGATIVE MG/DL — SIGNIFICANT CHANGE UP
RBC # BLD: 3.52 M/UL — LOW (ref 4.2–5.8)
RBC # BLD: 3.81 M/UL — LOW (ref 4.2–5.8)
RBC # BLD: 3.84 M/UL — LOW (ref 4.2–5.8)
RBC # FLD: 12.4 % — SIGNIFICANT CHANGE UP (ref 10.3–16.9)
RBC # FLD: 12.8 % — SIGNIFICANT CHANGE UP (ref 10.3–16.9)
RBC # FLD: 12.8 % — SIGNIFICANT CHANGE UP (ref 10.3–16.9)
RH IG SCN BLD-IMP: POSITIVE — SIGNIFICANT CHANGE UP
RH IG SCN BLD-IMP: POSITIVE — SIGNIFICANT CHANGE UP
SAO2 % BLDA: 95 % — SIGNIFICANT CHANGE UP (ref 95–100)
SODIUM SERPL-SCNC: 136 MMOL/L — SIGNIFICANT CHANGE UP (ref 135–145)
SODIUM SERPL-SCNC: 139 MMOL/L — SIGNIFICANT CHANGE UP (ref 135–145)
SP GR SPEC: <=1.005 — SIGNIFICANT CHANGE UP (ref 1–1.03)
TROPONIN T SERPL-MCNC: <0.01 NG/ML — SIGNIFICANT CHANGE UP (ref 0–0.01)
UROBILINOGEN FLD QL: 0.2 E.U./DL — SIGNIFICANT CHANGE UP
WBC # BLD: 7.3 K/UL — SIGNIFICANT CHANGE UP (ref 3.8–10.5)
WBC # BLD: 7.9 K/UL — SIGNIFICANT CHANGE UP (ref 3.8–10.5)
WBC # BLD: 8.8 K/UL — SIGNIFICANT CHANGE UP (ref 3.8–10.5)
WBC # FLD AUTO: 7.3 K/UL — SIGNIFICANT CHANGE UP (ref 3.8–10.5)
WBC # FLD AUTO: 7.9 K/UL — SIGNIFICANT CHANGE UP (ref 3.8–10.5)
WBC # FLD AUTO: 8.8 K/UL — SIGNIFICANT CHANGE UP (ref 3.8–10.5)

## 2019-01-08 PROCEDURE — 93306 TTE W/DOPPLER COMPLETE: CPT | Mod: 26

## 2019-01-08 PROCEDURE — 93970 EXTREMITY STUDY: CPT | Mod: 26

## 2019-01-08 PROCEDURE — 99232 SBSQ HOSP IP/OBS MODERATE 35: CPT

## 2019-01-08 PROCEDURE — 71045 X-RAY EXAM CHEST 1 VIEW: CPT | Mod: 26

## 2019-01-08 PROCEDURE — 99232 SBSQ HOSP IP/OBS MODERATE 35: CPT | Mod: GC

## 2019-01-08 RX ORDER — MAGNESIUM SULFATE 500 MG/ML
2 VIAL (ML) INJECTION
Qty: 0 | Refills: 0 | Status: COMPLETED | OUTPATIENT
Start: 2019-01-08 | End: 2019-01-08

## 2019-01-08 RX ORDER — NOREPINEPHRINE BITARTRATE/D5W 8 MG/250ML
0.05 PLASTIC BAG, INJECTION (ML) INTRAVENOUS
Qty: 8 | Refills: 0 | Status: DISCONTINUED | OUTPATIENT
Start: 2019-01-08 | End: 2019-01-08

## 2019-01-08 RX ORDER — HYDROMORPHONE HYDROCHLORIDE 2 MG/ML
30 INJECTION INTRAMUSCULAR; INTRAVENOUS; SUBCUTANEOUS
Qty: 0 | Refills: 0 | Status: DISCONTINUED | OUTPATIENT
Start: 2019-01-08 | End: 2019-01-10

## 2019-01-08 RX ORDER — VANCOMYCIN HCL 1 G
1000 VIAL (EA) INTRAVENOUS ONCE
Qty: 0 | Refills: 0 | Status: DISCONTINUED | OUTPATIENT
Start: 2019-01-08 | End: 2019-01-08

## 2019-01-08 RX ORDER — CEFEPIME 1 G/1
2000 INJECTION, POWDER, FOR SOLUTION INTRAMUSCULAR; INTRAVENOUS EVERY 8 HOURS
Qty: 0 | Refills: 0 | Status: COMPLETED | OUTPATIENT
Start: 2019-01-08 | End: 2019-01-08

## 2019-01-08 RX ORDER — NALOXONE HYDROCHLORIDE 4 MG/.1ML
0.1 SPRAY NASAL
Qty: 0 | Refills: 0 | Status: DISCONTINUED | OUTPATIENT
Start: 2019-01-08 | End: 2019-01-10

## 2019-01-08 RX ORDER — ACETAMINOPHEN 500 MG
1000 TABLET ORAL ONCE
Qty: 0 | Refills: 0 | Status: COMPLETED | OUTPATIENT
Start: 2019-01-08 | End: 2019-01-08

## 2019-01-08 RX ORDER — ONDANSETRON 8 MG/1
4 TABLET, FILM COATED ORAL EVERY 6 HOURS
Qty: 0 | Refills: 0 | Status: DISCONTINUED | OUTPATIENT
Start: 2019-01-08 | End: 2019-01-10

## 2019-01-08 RX ORDER — CEFEPIME 1 G/1
INJECTION, POWDER, FOR SOLUTION INTRAMUSCULAR; INTRAVENOUS
Qty: 0 | Refills: 0 | Status: DISCONTINUED | OUTPATIENT
Start: 2019-01-08 | End: 2019-01-08

## 2019-01-08 RX ORDER — CEFEPIME 1 G/1
2000 INJECTION, POWDER, FOR SOLUTION INTRAMUSCULAR; INTRAVENOUS EVERY 8 HOURS
Qty: 0 | Refills: 0 | Status: DISCONTINUED | OUTPATIENT
Start: 2019-01-08 | End: 2019-01-09

## 2019-01-08 RX ADMIN — Medication 5.73 MICROGRAM(S)/KG/MIN: at 00:49

## 2019-01-08 RX ADMIN — Medication 400 MILLIGRAM(S): at 07:19

## 2019-01-08 RX ADMIN — CEFEPIME 100 MILLIGRAM(S): 1 INJECTION, POWDER, FOR SOLUTION INTRAMUSCULAR; INTRAVENOUS at 09:02

## 2019-01-08 RX ADMIN — Medication 250 MILLIGRAM(S): at 06:11

## 2019-01-08 RX ADMIN — Medication 250 MILLIGRAM(S): at 17:32

## 2019-01-08 RX ADMIN — Medication 50 GRAM(S): at 03:23

## 2019-01-08 RX ADMIN — CEFEPIME 100 MILLIGRAM(S): 1 INJECTION, POWDER, FOR SOLUTION INTRAMUSCULAR; INTRAVENOUS at 16:35

## 2019-01-08 RX ADMIN — CEFEPIME 100 MILLIGRAM(S): 1 INJECTION, POWDER, FOR SOLUTION INTRAMUSCULAR; INTRAVENOUS at 01:20

## 2019-01-08 RX ADMIN — Medication 0.5 MILLIGRAM(S): at 18:00

## 2019-01-08 RX ADMIN — Medication 50 GRAM(S): at 01:31

## 2019-01-08 RX ADMIN — SODIUM CHLORIDE 100 MILLILITER(S): 9 INJECTION, SOLUTION INTRAVENOUS at 15:00

## 2019-01-08 RX ADMIN — HYDROMORPHONE HYDROCHLORIDE 30 MILLILITER(S): 2 INJECTION INTRAMUSCULAR; INTRAVENOUS; SUBCUTANEOUS at 17:08

## 2019-01-08 NOTE — CHART NOTE - NSCHARTNOTEFT_GEN_A_CORE
***Rapid Response Clinical Impact Nurse Practitioner Note***    HPI: Patient is a 53M status post Laminectomy fusion c/o low back pain x > 1 year.  Pt states back pain occasionally radiates down LE b/l but denies numbness or tingling down both lower extremities.  Pt currently takes 60mg MS Contin, 30mg oxycodone for his pain and states his pain is not well controlled.  Pt notes they have a hx of leg swelling which has been chronic in nature.  Pt ambulates independently.  Pt states they are lethargic today secondary to waking up early this morning for surgery.   Pt denies fever, chills, recent illness, CP, SOB, or being on any anticoagulation medication.    Pt has failed conservative treatment for his symptoms   Presents for elective exploration of spinal fusion, revision TLIF L1-Sacrum with cage implantation. (04 Jan 2019 13:46)    Rapid response team called because patient was received post op and hypotensive.     Patient was seen and examined at the bedside by the rapid response team. Patient found by writer lying in right lateral recumbent position. Patient was hypotensive 65/39 in a NSR in the 70's. Patient SpO2 100% on RA. Patient asleep and lethargic, but per patient's nurse this was baseline when she received him today. Per RN patient was a hydromorphone gtt which she shut off ~ 15 minutes prior to RRT because of the hypotension. Patient rectal temperature was 101 and BG was in the 100's. Patient responsive to painful stimulus but falling asleep again. Large bore IV access was obtained, and patient was pancultured.     Patient transferred to Surgical ICU.    -Follow up CXR.  -Follow up Blood cultures.  -Tylenol for fever.  -Follow up labs.  -NS bolus X 2 for 30 cc's/kg.  -Follow up Lactate.   -Consider naloxone, if patient continues to be hypotensive and respiratory suppression.   -Consider broadening antibiotics.   -Rest of care and management per SICU team. ***Rapid Response Clinical Impact Nurse Practitioner Note***    HPI: Patient is a 53M status post Laminectomy fusion c/o low back pain x > 1 year.  Pt states back pain occasionally radiates down LE b/l but denies numbness or tingling down both lower extremities.  Pt currently takes 60mg MS Contin, 30mg oxycodone for his pain and states his pain is not well controlled.  Pt notes they have a hx of leg swelling which has been chronic in nature.  Pt ambulates independently.  Pt states they are lethargic today secondary to waking up early this morning for surgery.   Pt denies fever, chills, recent illness, CP, SOB, or being on any anticoagulation medication.    Pt has failed conservative treatment for his symptoms   Presents for elective exploration of spinal fusion, revision TLIF L1-Sacrum with cage implantation. (04 Jan 2019 13:46)    Rapid response team called because patient was received post op and hypotensive.     Patient was seen and examined at the bedside by the rapid response team. Patient found by writer lying in right lateral recumbent position. Patient was hypotensive 65/39 in a NSR in the 70's. Patient SpO2 100% on RA. Patient asleep and lethargic, but per patient's nurse this was baseline when she received him today. Per RN patient was a hydromorphone gtt which she shut off ~ 15 minutes prior to RRT because of the hypotension. Patient rectal temperature was 101 and BG was in the 100's. Patient responsive to painful stimulus but falling asleep again. Large bore IV access was obtained, and patient was pancultured.     Patient transferred to Surgical ICU.    -Follow up CXR.  -Follow up Blood cultures.  -Tylenol for fever.  -Follow up labs.  -NS bolus X 2 for 30 cc's/kg.  -Follow up Lactate.   -Consider naloxone, if patient continues to be hypotensive and respiratory suppression. Nalaxone withheld at time of RRT because patient was maintaining airway, breathing normally, and pupil were reactive and 3 mm.   -Consider broadening antibiotics.   -Rest of care and management per SICU team. ***Rapid Response Clinical Impact Nurse Practitioner Note***    HPI: Patient is a 53M status post Laminectomy fusion c/o low back pain x > 1 year.  Pt states back pain occasionally radiates down LE b/l but denies numbness or tingling down both lower extremities.  Pt currently takes 60mg MS Contin, 30mg oxycodone for his pain and states his pain is not well controlled.  Pt notes they have a hx of leg swelling which has been chronic in nature.  Pt ambulates independently.  Pt states they are lethargic today secondary to waking up early this morning for surgery.   Pt denies fever, chills, recent illness, CP, SOB, or being on any anticoagulation medication.    Pt has failed conservative treatment for his symptoms   Presents for elective exploration of spinal fusion, revision TLIF L1-Sacrum with cage implantation. (04 Jan 2019 13:46)    Rapid response team called because patient was received post op and hypotensive.     Patient was seen and examined at the bedside by the rapid response team. Patient found by writer lying in right lateral recumbent position. Patient was hypotensive 65/39 in a NSR in the 70's. Patient SpO2 100% on RA. Patient asleep and lethargic, but per patient's nurse this was baseline when she received him today. Per RN patient was a hydromorphone gtt which she shut off ~ 15 minutes prior to RRT because of the hypotension. Patient rectal temperature was 101 and BG was in the 100's. Patient responsive to painful stimulus but falling asleep again. Large bore IV access was obtained, and patient was pancultured. Dressings appear to be CDI.     Patient transferred to Surgical ICU.    -Follow up CXR.  -Follow up Blood cultures.  -Tylenol for fever.  -Follow up labs.  -NS bolus X 2 for 30 cc's/kg.  -Follow up Lactate.   -Consider naloxone, if patient continues to be hypotensive and respiratory suppression. Nalaxone withheld at time of RRT because patient was maintaining airway, breathing normally, and pupil were reactive and 3 mm.   -Consider broadening antibiotics.   -Rest of care and management per SICU team.

## 2019-01-08 NOTE — PROGRESS NOTE ADULT - ATTENDING COMMENTS
Pt easily arousable and agrees to repeat BC.  Temps low grade and ECHO with possible MV vegetation. Hold PCA per pain management and ID consulted. continue Vanco and Cefepime. Pt denies recent IV drug use. Ct Head requested.

## 2019-01-08 NOTE — PROGRESS NOTE ADULT - SUBJECTIVE AND OBJECTIVE BOX
ORTHO NOTE    [x ] Pt seen/examined at 8:30am and discussed plan of care with SICU.  Patient sleeping and not arousable to stimulation  [ ] Pt without any complaints/in NAD.    [ ] Pt complains of:      ROS: [ ] Fever  [ ] Chills  [ ] CP [ ] SOB [ ] Dysnea  [ ] Palpitations [ ] Cough [ ] N/V/C/D [ ] Paresthia [ ] Other     [x ] ROS  otherwise negative    .    PHYSICAL EXAM:    Vital Signs Last 24 Hrs  T(C): 38.1 (08 Jan 2019 08:58), Max: 38.3 (08 Jan 2019 07:00)  T(F): 100.6 (08 Jan 2019 08:58), Max: 101 (08 Jan 2019 07:00)  HR: 96 (08 Jan 2019 08:00) (80 - 114)  BP: 106/63 (08 Jan 2019 08:00) (65/36 - 134/91)  BP(mean): 73 (08 Jan 2019 08:00) (61 - 109)  RR: 18 (08 Jan 2019 08:00) (7 - 43)  SpO2: 97% (08 Jan 2019 08:00) (85% - 100%)    I&O's Detail    07 Jan 2019 07:01  -  08 Jan 2019 07:00  --------------------------------------------------------  IN:    IV PiggyBack: 550 mL    lactated ringers.: 1400 mL    norepinephrine Infusion: 60 mL    Oral Fluid: 115 mL    Sodium Chloride 0.9% IV Bolus: 1000 mL    Solution: 100 mL  Total IN: 3225 mL    OUT:    Accordian: 295 mL    Accordian: 445 mL    Indwelling Catheter - Urethral: 1960 mL  Total OUT: 2700 mL    Total NET: 525 mL      08 Jan 2019 07:01  -  08 Jan 2019 08:59  --------------------------------------------------------  IN:    lactated ringers.: 100 mL  Total IN: 100 mL    OUT:    Accordian: 70 mL    Accordian: 80 mL    Indwelling Catheter - Urethral: 600 mL  Total OUT: 750 mL    Total NET: -650 mL           CAPILLARY BLOOD GLUCOSE      POCT Blood Glucose.: 115 mg/dL (08 Jan 2019 00:05)                  Neuro: Patient sleeping and not arousable to stimulation    Lungs: Spo2 98% on RA    CV: BP 94/66 HR 98    ABD: soft, nontender    Ext: patient sleeping and unable to participate in physical exam    LABS                        12.1   7.9   )-----------( 147      ( 08 Jan 2019 00:18 )             35.4                                01-08    139  |  103  |  7   ----------------------------<  118<H>  4.3   |  27  |  0.76    Ca    7.7<L>      08 Jan 2019 00:18  Phos  2.6     01-08  Mg     1.3     01-08    TPro  5.0<L>  /  Alb  2.8<L>  /  TBili  0.7  /  DBili  x   /  AST  27  /  ALT  20  /  AlkPhos  52  01-08      [ ] Other Labs  [ ] None ordered            Please check or [place] when present:  •  Heart Failure:    [ ] Acute        [ ]  Acute on Chronic        [ ] Chronic         [ ] Diastolic     [ ]  Combined    •  MARY:     [ ] ATN        [ ]  Renal medullary necrosis       [ ]  Renal cortical necrosis                  [ ] Other pathological Lesion:  •  CKD:  [ ] Stage I   [ ] Stage II  [ ] Stage III    [ ]Stage IV   [ ]  CKD V   [ ]  Other/Unspecified:    •  Abdominal Nutritional Status:   [ ] Malnutrition-See Nutrition note    [ ] Cachexia   [ ]  Other        [ ] Supplement ordered:            [ ] Morbid Obesity: BMI >=40         ASSESSMENT/PLAN:      STATUS POST: POD#1 s/p ROSA, exploration of fusion, revision laminectomy, primary laminectomy L2-L4, reinstrumentation of posterior spinal fusion L1-sacrum     HVx2   medical management per SICU- IV vancomycin  patient was likely oversedated from narcotics; pain control per pain management  patient repositioned and turned  CONTINUE:          [ ] PT- wbat    [ ] DVT PPX- scd    [ ] Pain Mgt- IV tylenol     [ ] Dispo plan- pending PT evaluation

## 2019-01-08 NOTE — CONSULT NOTE ADULT - ASSESSMENT
53y old Male with chronic pain history, Hep C, and h/o spinal stenosis and spinal fusion, s/p laminectomy fusion now POD#0, now hypotensive likely 2/2 oversedation  Neuro: hold narcotics until patient is more arousable. pain control with tylenol prn. will consider narcan if s/s respiratory depression  CV: levo for MAP>65, LR@100  Pulm: BOLA MACIASI: NPO  : Tomasa  Endo: no issues  ID: vanc (1/7--), cefepime (1/7--). Will consult ID for approval.   Ppx: SCDs  Lines: PIVs  Wounds: hemovac x 2

## 2019-01-08 NOTE — PROGRESS NOTE ADULT - SUBJECTIVE AND OBJECTIVE BOX
Ortho Note    Pt transferred to SICU overnight for lethargy and hypotension. Narcotics held and levo given. Still lethargic but awake and oriented this am. Appropriate and able to answer all questions. Levo DC'd this am. Pain controlled  Denies CP, SOB, N/V, numbness/tingling     Vital Signs Last 24 Hrs  T(C): 38.3 (01-08-19 @ 07:00), Max: 38.3 (01-08-19 @ 07:00)  T(F): 101 (01-08-19 @ 07:00), Max: 101 (01-08-19 @ 07:00)  HR: 96 (01-08-19 @ 07:00) (82 - 96)  BP: 117/74 (01-08-19 @ 07:00) (98/68 - 120/70)  BP(mean): 85 (01-08-19 @ 07:00) (77 - 88)  RR: 18 (01-08-19 @ 07:00) (12 - 18)  SpO2: 98% (01-08-19 @ 07:00) (98% - 100%)    Awake and oriented, NAD  Back DSG C/D/I, HV x2 in place and holding drain  Pulses: 2+ DP/PT bilaterally   Sensation: s/s/sp/dp/t intact bilaterally   Motor: EHL/FHL/TA/GS 5/5 bilaterally  Bilateral LE mild swelling, toes WWP                           12.1   7.9   )-----------( 147      ( 08 Jan 2019 00:18 )             35.4   08 Jan 2019 00:18    139    |  103    |  7      ----------------------------<  118    4.3     |  27     |  0.76     Phos  2.6       08 Jan 2019 00:18  Mg     1.3       08 Jan 2019 00:18    TPro  5.0    /  Alb  2.8    /  TBili  0.7    /  DBili  x      /  AST  27     /  ALT  20     /  AlkPhos  52     08 Jan 2019 00:18    Drain output:    01-07-19 @ 07:01  -  01-08-19 @ 07:00  --------------------------------------------------------  OUT:    Accordian: 295 mL    Accordian: 445 mL  Total OUT: 740 mL    A/P: 53M POD#1 s/p ROSA, exploration of fusion, revision laminectomy, primary laminectomy L2-L4, reinstrumentation of posterior spinal fusion L1-sacrum   - Stable in SICU  - Pain Control  - DVT ppx: SCDs  - monitor drains  - Abx: vanc and cefepime   - PT, WBS: WBAT  - Appreciate cars recs, will f/u  - f/u BL LE doppler to r/o DVT     Ortho Pager [phone]

## 2019-01-08 NOTE — PROGRESS NOTE ADULT - SUBJECTIVE AND OBJECTIVE BOX
Chief Complaint/Reason for Consult: r/o endocarditis  INTERVAL HPI: noted for step up to sicu, temp 101, tele reviewed no events  	  MEDICATIONS:    cefepime   IVPB 2000 milliGRAM(s) IV Intermittent every 8 hours  vancomycin  IVPB 1000 milliGRAM(s) IV Intermittent every 12 hours      LORazepam   Injectable 0.5 milliGRAM(s) IV Push every 12 hours  ondansetron Injectable 4 milliGRAM(s) IV Push every 6 hours PRN        lactated ringers. 1000 milliLiter(s) IV Continuous <Continuous>      REVIEW OF SYSTEMS:  [x] As per HPI  CONSTITUTIONAL: No fever, weight loss, or fatigue  RESPIRATORY: No cough, wheezing, chills or hemoptysis; No Shortness of Breath  CARDIOVASCULAR: No chest pain, palpitations, dizziness, or leg swelling  GASTROINTESTINAL: No abdominal or epigastric pain. No nausea, vomiting, or hematemesis; No diarrhea or constipation. No melena or hematochezia.  MUSCULOSKELETAL: No joint pain or swelling; No muscle, back, or extremity pain  [x] All others negative	  [ ] Unable to obtain    PHYSICAL EXAM:  T(C): 38.1 (19 @ 08:58), Max: 38.3 (19 @ 07:00)  HR: 96 (19 @ 09:00) (80 - 114)  BP: 92/60 (19 @ 09:00) (65/36 - 134/91)  RR: 18 (19 @ 09:00) (7 - 43)  SpO2: 97% (19 @ 09:00) (85% - 100%)  Wt(kg): --  I&O's Summary    2019 07:  -  2019 07:00  --------------------------------------------------------  IN: 3225 mL / OUT: 2700 mL / NET: 525 mL    2019 07:  -  2019 10:01  --------------------------------------------------------  IN: 150 mL / OUT: 750 mL / NET: -600 mL          Appearance: Normal	  HEENT:   Normal oral mucosa  Cardiovascular: Normal S1 S2, No JVD, No murmurs, No edema  Respiratory: Lungs clear to auscultation	  Gastrointestinal:  Soft, Non-tender, + BS	  Extremities: Normal range of motion, No clubbing, cyanosis or edema  Vascular: Peripheral pulses palpable 2+ bilaterally    TELEMETRY: 	    ECG:   	  RADIOLOGY:   CXR:  CT:  US:    CARDIAC TESTING:  Echocardiogram:  Catheterization:  Stress Test:      LABS:	 	    CARDIAC MARKERS:                                  12.1   7.9   )-----------( 147      ( 2019 00:18 )             35.4     01-08    139  |  103  |  7   ----------------------------<  118<H>  4.3   |  27  |  0.76    Ca    7.7<L>      2019 00:18  Phos  2.6     01-08  Mg     1.3     -08    TPro  5.0<L>  /  Alb  2.8<L>  /  TBili  0.7  /  DBili  x   /  AST  27  /  ALT  20  /  AlkPhos  52  -08    proBNP:   Lipid Profile:   HgA1c:   TSH:     ASSESSMENT/PLAN: 	    #r/o endocarditis - will base Rx on modified dukes criteria  Hep C, IVDU  please obtain the followin sets of blood cultures under sterile technique  CBC with manual differential  ESR, CRP  12 lead ECG  Echo - transthoracic ( no need for KRISTINE yet)  ID consultation

## 2019-01-08 NOTE — PROGRESS NOTE ADULT - SUBJECTIVE AND OBJECTIVE BOX
S: Pt is resting comfortably, says that he is "exhausted." Difficult to arouse though A&O x3. Patient knew that today was his birthday and that he is in the hospital.    Vitals: Vital Signs Last 24 Hrs  T(C): 38.3 (08 Jan 2019 07:00), Max: 38.3 (08 Jan 2019 07:00)  T(F): 101 (08 Jan 2019 07:00), Max: 101 (08 Jan 2019 07:00)  HR: 96 (08 Jan 2019 07:00) (80 - 114)  BP: 117/74 (08 Jan 2019 07:00) (65/36 - 134/91)  BP(mean): 85 (08 Jan 2019 07:00) (61 - 109)  RR: 18 (08 Jan 2019 07:00) (7 - 43)  SpO2: 98% (08 Jan 2019 07:00) (85% - 100%)          I&O:  I&O's Detail    07 Jan 2019 07:01  -  08 Jan 2019 07:00  --------------------------------------------------------  IN:    IV PiggyBack: 550 mL    lactated ringers.: 1400 mL    norepinephrine Infusion: 60 mL    Oral Fluid: 115 mL    Sodium Chloride 0.9% IV Bolus: 1000 mL    Solution: 100 mL  Total IN: 3225 mL    OUT:    Accordian: 295 mL    Accordian: 445 mL    Indwelling Catheter - Urethral: 1910 mL  Total OUT: 2650 mL    Total NET: 575 mL        ICU Vital Signs Last 24 Hrs  T(C): 38.3 (08 Jan 2019 07:00), Max: 38.3 (08 Jan 2019 07:00)  T(F): 101 (08 Jan 2019 07:00), Max: 101 (08 Jan 2019 07:00)  HR: 96 (08 Jan 2019 07:00) (80 - 114)  BP: 117/74 (08 Jan 2019 07:00) (65/36 - 134/91)  BP(mean): 85 (08 Jan 2019 07:00) (61 - 109)  ABP: 124/46 (07 Jan 2019 17:30) (92/62 - 134/74)  ABP(mean): 68 (07 Jan 2019 17:30) (60 - 96)  RR: 18 (08 Jan 2019 07:00) (7 - 43)  SpO2: 98% (08 Jan 2019 07:00) (85% - 100%)      CAPILLARY BLOOD GLUCOSE      POCT Blood Glucose.: 115 mg/dL (08 Jan 2019 00:05)      Labs:                         12.1   7.9   )-----------( 147      ( 08 Jan 2019 00:18 )             35.4   01-08    139  |  103  |  7   ----------------------------<  118<H>  4.3   |  27  |  0.76    Ca    7.7<L>      08 Jan 2019 00:18  Phos  2.6     01-08  Mg     1.3     01-08    TPro  5.0<L>  /  Alb  2.8<L>  /  TBili  0.7  /  DBili  x   /  AST  27  /  ALT  20  /  AlkPhos  52  01-08  Urinalysis Basic - ( 08 Jan 2019 01:32 )    Color: Yellow / Appearance: Clear / SG: <=1.005 / pH: x  Gluc: x / Ketone: NEGATIVE  / Bili: Negative / Urobili: 0.2 E.U./dL   Blood: x / Protein: NEGATIVE mg/dL / Nitrite: NEGATIVE   Leuk Esterase: NEGATIVE / RBC: < 5 /HPF / WBC < 5 /HPF   Sq Epi: x / Non Sq Epi: 0-5 /HPF / Bacteria: Present /HPF        Electrolytes repleated to goals as follows: Potassium 4, Phosphorus 3 and Magnesium 2 where appropriate and necessary    Exam:  Neuro: A&Ox3, NAD, difficult to arouse  Neck: Supple  CV: RRR, no MRGs  Pulm: CTAB, no wheezes, rales ronchi  Abd: BS Soft, NT, ND  : Randolph in place  Ext: No edema peripherally or centrally  Vasc: Extremities warm to palpation, 2+ pulses - radial and PT  MSK: no joint swelling  Skin: no rash    MEDICATIONS  (STANDING):  cefepime   IVPB 2000 milliGRAM(s) IV Intermittent every 8 hours  cefepime   IVPB 2000 milliGRAM(s) IV Intermittent every 8 hours  lactated ringers. 1000 milliLiter(s) (100 mL/Hr) IV Continuous <Continuous>  norepinephrine Infusion 0.05 MICROgram(s)/kG/Min (5.728 mL/Hr) IV Continuous <Continuous>  vancomycin  IVPB 1000 milliGRAM(s) IV Intermittent every 12 hours    MEDICATIONS  (PRN):  naloxone Injectable 0.1 milliGRAM(s) IV Push every 3 minutes PRN For ANY of the following changes in patient status:  A. RR LESS THAN 10 breaths per minute, B. Oxygen saturation LESS THAN 90%, C. Sedation score of 6  ondansetron Injectable 4 milliGRAM(s) IV Push every 6 hours PRN Nausea

## 2019-01-08 NOTE — CONSULT NOTE ADULT - SUBJECTIVE AND OBJECTIVE BOX
HPI:  53M s/p Laminectomy fusion c/o low back pain x > 1 year.  Pt states back pain occasionally radiates down LE b/l but denies numbness or tingling down both lower extremities.  Pt currently takes 60mg MS Contin, 30mg oxycodone for his pain and states his pain is not well controlled.  Pt notes they have a hx of leg swelling which has been chronic in nature.  Pt ambulates independently.  Pt states they are lethargic today secondary to waking up early this morning for surgery.   Pt denies fever, chills, recent illness, CP, SOB, or being on any anticoagulation medication.    Pt has failed conservative treatment for his symptoms   Presents for elective exploration of spinal fusion, revision TLIF L1-Sacrum with cage implantation. (04 Jan 2019 13:46)    SICU addendum: Rapid response was called on 9wo for hypotension and lethargy s/p spinal exploration on1/7/19. SICU team consulted for transfer for management of suspected septic shock. Pt seen and examined with rapid response team at bedside. Pt found to be lethargic with pinpoint pupils, tmax 100.2. ?endocarditis per orhto team, cardiology consulted and work-up pending. Pt currently denies CP/SOB/surgical pain/N/V/cough/headache. Pt was given 2L NS bolus and levo was started.       PAST MEDICAL & SURGICAL HISTORY:  Depression  MS (multiple sclerosis)  Hepatitis C  Surgery, elective: clavical sx  Surgery, elective: left ankle 2012  Surgery, elective: spinal fusion x 2  lumbar      ROS: See HPI    MEDICATIONS  (STANDING):  acetaminophen   Tablet .. 975 milliGRAM(s) Oral every 8 hours  cefepime   IVPB 2000 milliGRAM(s) IV Intermittent every 8 hours  cefepime   IVPB 2000 milliGRAM(s) IV Intermittent every 8 hours  lactated ringers. 1000 milliLiter(s) (100 mL/Hr) IV Continuous <Continuous>  magnesium sulfate  IVPB 2 Gram(s) IV Intermittent every 1 hour  norepinephrine Infusion 0.05 MICROgram(s)/kG/Min (5.728 mL/Hr) IV Continuous <Continuous>  vancomycin  IVPB 1000 milliGRAM(s) IV Intermittent every 12 hours    MEDICATIONS  (PRN):  naloxone Injectable 0.1 milliGRAM(s) IV Push every 3 minutes PRN For ANY of the following changes in patient status:  A. RR LESS THAN 10 breaths per minute, B. Oxygen saturation LESS THAN 90%, C. Sedation score of 6  ondansetron Injectable 4 milliGRAM(s) IV Push every 6 hours PRN Nausea      Allergies    No Known Allergies    Intolerances    erythromycin (Stomach Upset)      SOCIAL HISTORY:  Smoke: Never Smoker  EtOH: occasional    FAMILY HISTORY:      Vital Signs Last 24 Hrs  T(C): 37.9 (07 Jan 2019 23:29), Max: 37.9 (07 Jan 2019 21:37)  T(F): 100.2 (07 Jan 2019 23:29), Max: 100.2 (07 Jan 2019 21:37)  HR: 100 (08 Jan 2019 00:01) (80 - 114)  BP: 65/36 (08 Jan 2019 00:01) (65/36 - 134/91)  BP(mean): 68 (07 Jan 2019 19:00) (68 - 109)  RR: 12 (08 Jan 2019 00:01) (7 - 43)  SpO2: 98% (08 Jan 2019 00:01) (85% - 100%)    PHYSICAL EXAM    Gen: male patient resting comfortably in bed.  Neuro: lethargic but arousable, A&Ox3, MAEx4. pinpoint pupils.   HEENT: MMM, no scleral icterus  CV: tachycardic   Pulm: CTA x2  Abd: Soft, NT/ND  : davis draining clear urine  Back: drains in place with expected SS output. Dressing CDI  Ext: WWP, b/l pedal edema    LABS:                        12.1   7.9   )-----------( 147      ( 08 Jan 2019 00:18 )             35.4     01-08    139  |  103  |  7   ----------------------------<  118<H>  4.3   |  27  |  0.76    Ca    7.7<L>      08 Jan 2019 00:18  Phos  2.6     01-08  Mg     1.3     01-08    TPro  5.0<L>  /  Alb  2.8<L>  /  TBili  0.7  /  DBili  x   /  AST  27  /  ALT  20  /  AlkPhos  52  01-08

## 2019-01-08 NOTE — CONSULT NOTE ADULT - ATTENDING COMMENTS
based on serial neuro exams, showing improving alertness and willingness to follow commands and strength, most likely diagnosis for his lethargy is adverse effect of opioids he usually takes, with synergistic effect of intra operative meds which were required. no evidence of aspiration or other sepsis. continue to observe in icu to manage pain control judiciously.  keep hob elevated to avoid aspiration.    ros: as per hpi, otherwise no pertinent positive elements.  FH: no pertinent positive elements.

## 2019-01-08 NOTE — PROVIDER CONTACT NOTE (OTHER) - SITUATION
BP down 76/46 mmhg, remains tachycardic 105.  temp=100.2F. Said MD made aware Hemovac #1  put out total of 255 ml sanguineous fluid since start of shift, hemovac # 2 with 160 ml bloody fluid

## 2019-01-08 NOTE — PROVIDER CONTACT NOTE (OTHER) - SITUATION
Px reportedly with SR-ST as much as 114 bpm as per day RN report. Since px was received, noted to be in ST low 100(<110 bpm)

## 2019-01-08 NOTE — PROGRESS NOTE ADULT - SUBJECTIVE AND OBJECTIVE BOX
Pain Management Progress Note - [facility] [phone]    HPI: Patient seen and examined today, in SICU, in no apparent distress, patient hypotensive 90/67 bp, asymptomatic. Patient admitted to SICU overnight, pt was febrile, hypotensive, and lethargic. Patient complains of lower back and surgical site pain. Discussed POC with ICU team and Dr. Sharma, plan to restart Dilaudid PCA.      Pain is ___ sharp _x___dull ___burning _x__achy ___ Intensity: ____ mild _x__mod _x__severe     Location _x___surgical site ____cervical __x___lumbar ____abd ____upper ext____lower ext    Worse with __x__activity __x__movement _____physical therapy___ Rest    Improved with __x__medication __x__rest ____physical therapy      PAST MEDICAL & SURGICAL HISTORY:  Depression  MS (multiple sclerosis)  Hepatitis C  Surgery, elective: clavical sx  Surgery, elective: left ankle 2012  Surgery, elective: spinal fusion x 2  lumbar  Depression  COPD (chronic obstructive pulmonary disease)  MS (multiple sclerosis)  Hepatitis C  Spinal stenosis of lumbar region, unspecified whether neurogenic claudication present  Spinal fusion failure, sequela  Other kyphosis of thoracolumbar region  Spinal fusion failure, sequela  Spinal stenosis of lumbar region, unspecified whether neurogenic claudication present  Other kyphosis of thoracolumbar region  Laminectomy of lumbar spine 3 or more levels with exploration or decompression or both  Spinal stenosis of lumbosacral region  MS (multiple sclerosis)  Depression  Hepatitis C  Spinal stenosis of lumbosacral region  Laminectomy of lumbar spine 3 or more levels with exploration or decompression or both  Surgery, elective        BUpivacaine liposome 1.3% Injectable (no eMAR)  oxyCODONE  ER Tablet  oxyCODONE    IR  HYDROmorphone  Injectable  acetaminophen   Tablet ..  HYDROmorphone  Injectable  lactated ringers.  vancomycin  IVPB  HYDROmorphone  Injectable  HYDROmorphone PCA (1 mG/mL)  HYDROmorphone PCA (1 mG/mL) Rescue Clinician Bolus  naloxone Injectable  ondansetron Injectable  HYDROmorphone  Injectable  HYDROmorphone  Injectable  ALPRAZolam  HYDROmorphone PCA (1 mG/mL) Rescue Clinician Bolus  naloxone Injectable  sodium chloride 0.9% Bolus  norepinephrine Infusion  vancomycin  IVPB  cefepime   IVPB  magnesium sulfate  IVPB  acetaminophen  IVPB ..  LORazepam   Injectable  HYDROmorphone PCA (1 mG/mL)  naloxone Injectable  ondansetron Injectable      ROS: Const:  _-__febrile   Eyes:___ENT:___CV: _-__chest pain  Resp: __-__sob  GI:_-__nausea _-__vomiting _-__abd pain ___npo ___clears x__full diet __bm  :___ Musk: _x__pain _x__spasm  Skin:___ Neuro:  _-__vyrmbrwf_-__zhbdvmkst_-__ numbness _x__weakness _-__paresth  Psych:-__anxiety  Endo:___ Heme:___Allergy:_________, _x__all others reviewed and negative        01-08 @ 11:68006 mL/min/1.73M2      01-08 @ 00:88156 mL/min/1.73M2      Hemoglobin: 11.2 g/dL (01-08 @ 11:19)  Hemoglobin: 12.1 g/dL (01-08 @ 00:18)  Hemoglobin: 11.8 g/dL (01-07 @ 23:53)        T(C): 38.1 (01-08-19 @ 08:58), Max: 38.3 (01-08-19 @ 07:00)  HR: 90 (01-08-19 @ 12:00) (80 - 114)  BP: 98/56 (01-08-19 @ 12:00) (65/36 - 134/91)  RR: 19 (01-08-19 @ 12:00) (7 - 43)  SpO2: 98% (01-08-19 @ 12:00) (85% - 100%)  Wt(kg): --       PHYSICAL EXAM:  Gen Appearance: _x__no acute distress __x_appropriate        Neuro: ___SILT feet____ EOM Intact Psych: AAOX_3_, _x__mood/affect appropriate        Eyes: _x__conjunctiva WNL  __x___ Pupils equal and round        ENT: __x_ears and nose atraumatic_x__ Hearing grossly intact        Neck: _x__trachea midline, no visible masses ___thyroid without palpable mass    Resp: _x__Nml WOB____No tactile fremitus ___clear to auscultation    Cardio: _x__extremities free from edema __x__pedal pulses palpable    GI/Abdomen: _x__soft __x___ Nontender____x__Nondistended_____HSM    Lymphatic: ___no palpable nodes in neck  ___no palpable nodes calves and feet    Skin/Wound: _x__Incision, _x__Dressing c/d/i,   _x___surrounding tissues soft,  _x__drain/chest tube present____    Muscular: EHL _5__/5  Gastrocnemius_5__/5    ___absent clubbing/cyanosis        ASSESSMENT: This is a 53y old Male with a history of spinal stenosis and spinal fusion, s/p laminectomy fusion, post op day 1.       Recommended Treatment PLAN:  1. Dilaudid PCA 0.3mg, Q6 minutes 12mg 4 hour max   Plan discussed with Vicente Feliz Pain Management Progress Note - [facility] [phone]    HPI: Patient seen and examined today, in SICU, in no apparent distress, patient hypotensive 90/67 bp, asymptomatic. Patient admitted to SICU overnight, pt was febrile, hypotensive, and lethargic. Patient complains of lower back and surgical site pain. Discussed POC with ICU team and Dr. Sharma, plan to restart Dilaudid PCA.      Pain is ___ sharp _x___dull ___burning _x__achy ___ Intensity: ____ mild _x__mod _x__severe     Location _x___surgical site ____cervical __x___lumbar ____abd ____upper ext____lower ext    Worse with __x__activity __x__movement _____physical therapy___ Rest    Improved with __x__medication __x__rest ____physical therapy      PAST MEDICAL & SURGICAL HISTORY:  Depression  MS (multiple sclerosis)  Hepatitis C  Surgery, elective: clavical sx  Surgery, elective: left ankle 2012  Surgery, elective: spinal fusion x 2  lumbar  Depression  COPD (chronic obstructive pulmonary disease)  MS (multiple sclerosis)  Hepatitis C  Spinal stenosis of lumbar region, unspecified whether neurogenic claudication present  Spinal fusion failure, sequela  Other kyphosis of thoracolumbar region  Spinal fusion failure, sequela  Spinal stenosis of lumbar region, unspecified whether neurogenic claudication present  Other kyphosis of thoracolumbar region  Laminectomy of lumbar spine 3 or more levels with exploration or decompression or both  Spinal stenosis of lumbosacral region  MS (multiple sclerosis)  Depression  Hepatitis C  Spinal stenosis of lumbosacral region  Laminectomy of lumbar spine 3 or more levels with exploration or decompression or both  Surgery, elective        BUpivacaine liposome 1.3% Injectable (no eMAR)  oxyCODONE  ER Tablet  oxyCODONE    IR  HYDROmorphone  Injectable  acetaminophen   Tablet ..  HYDROmorphone  Injectable  lactated ringers.  vancomycin  IVPB  HYDROmorphone  Injectable  HYDROmorphone PCA (1 mG/mL)  HYDROmorphone PCA (1 mG/mL) Rescue Clinician Bolus  naloxone Injectable  ondansetron Injectable  HYDROmorphone  Injectable  HYDROmorphone  Injectable  ALPRAZolam  HYDROmorphone PCA (1 mG/mL) Rescue Clinician Bolus  naloxone Injectable  sodium chloride 0.9% Bolus  norepinephrine Infusion  vancomycin  IVPB  cefepime   IVPB  magnesium sulfate  IVPB  acetaminophen  IVPB ..  LORazepam   Injectable  HYDROmorphone PCA (1 mG/mL)  naloxone Injectable  ondansetron Injectable      ROS: Const:  _-__febrile   Eyes:___ENT:___CV: _-__chest pain  Resp: __-__sob  GI:_-__nausea _-__vomiting _-__abd pain ___npo ___clears x__full diet __bm  :___ Musk: _x__pain _x__spasm  Skin:___ Neuro:  _-__oczounxu_-__jfiqrpcxe_-__ numbness _x__weakness _-__paresth  Psych:-__anxiety  Endo:___ Heme:___Allergy:_________, _x__all others reviewed and negative        01-08 @ 11:12120 mL/min/1.73M2      01-08 @ 00:59035 mL/min/1.73M2      Hemoglobin: 11.2 g/dL (01-08 @ 11:19)  Hemoglobin: 12.1 g/dL (01-08 @ 00:18)  Hemoglobin: 11.8 g/dL (01-07 @ 23:53)        T(C): 38.1 (01-08-19 @ 08:58), Max: 38.3 (01-08-19 @ 07:00)  HR: 90 (01-08-19 @ 12:00) (80 - 114)  BP: 98/56 (01-08-19 @ 12:00) (65/36 - 134/91)  RR: 19 (01-08-19 @ 12:00) (7 - 43)  SpO2: 98% (01-08-19 @ 12:00) (85% - 100%)  Wt(kg): --       PHYSICAL EXAM:  Gen Appearance: _x__no acute distress __x_appropriate        Neuro: ___SILT feet____ EOM Intact Psych: AAOX_3_, _x__mood/affect appropriate        Eyes: _x__conjunctiva WNL  __x___ Pupils equal and round        ENT: __x_ears and nose atraumatic_x__ Hearing grossly intact        Neck: _x__trachea midline, no visible masses ___thyroid without palpable mass    Resp: _x__Nml WOB____No tactile fremitus ___clear to auscultation    Cardio: _x__extremities free from edema __x__pedal pulses palpable    GI/Abdomen: _x__soft __x___ Nontender____x__Nondistended_____HSM    Lymphatic: ___no palpable nodes in neck  ___no palpable nodes calves and feet    Skin/Wound: _x__Incision, _x__Dressing c/d/i,   _x___surrounding tissues soft,  _x__drain/chest tube present____    Muscular: EHL _5__/5  Gastrocnemius_5__/5    ___absent clubbing/cyanosis        ASSESSMENT: This is a 53y old Male with a history of spinal stenosis and spinal fusion, s/p laminectomy fusion, post op day 1.       Recommended Treatment PLAN:  1. Dilaudid PCA 0.3mg, Q6 minutes 12mg 4 hour max   Plan discussed with Jose Eduardo Biggs

## 2019-01-09 LAB
ANION GAP SERPL CALC-SCNC: 13 MMOL/L — SIGNIFICANT CHANGE UP (ref 5–17)
BUN SERPL-MCNC: 8 MG/DL — SIGNIFICANT CHANGE UP (ref 7–23)
CALCIUM SERPL-MCNC: 8.1 MG/DL — LOW (ref 8.4–10.5)
CHLORIDE SERPL-SCNC: 97 MMOL/L — SIGNIFICANT CHANGE UP (ref 96–108)
CO2 SERPL-SCNC: 23 MMOL/L — SIGNIFICANT CHANGE UP (ref 22–31)
CREAT SERPL-MCNC: 0.63 MG/DL — SIGNIFICANT CHANGE UP (ref 0.5–1.3)
GLUCOSE SERPL-MCNC: 127 MG/DL — HIGH (ref 70–99)
HCT VFR BLD CALC: 31.3 % — LOW (ref 39–50)
HGB BLD-MCNC: 10.6 G/DL — LOW (ref 13–17)
MAGNESIUM SERPL-MCNC: 1.9 MG/DL — SIGNIFICANT CHANGE UP (ref 1.6–2.6)
MCHC RBC-ENTMCNC: 31.6 PG — SIGNIFICANT CHANGE UP (ref 27–34)
MCHC RBC-ENTMCNC: 33.9 G/DL — SIGNIFICANT CHANGE UP (ref 32–36)
MCV RBC AUTO: 93.4 FL — SIGNIFICANT CHANGE UP (ref 80–100)
PHOSPHATE SERPL-MCNC: 2.3 MG/DL — LOW (ref 2.5–4.5)
PLATELET # BLD AUTO: 133 K/UL — LOW (ref 150–400)
POTASSIUM SERPL-MCNC: 3.9 MMOL/L — SIGNIFICANT CHANGE UP (ref 3.5–5.3)
POTASSIUM SERPL-SCNC: 3.9 MMOL/L — SIGNIFICANT CHANGE UP (ref 3.5–5.3)
RBC # BLD: 3.35 M/UL — LOW (ref 4.2–5.8)
RBC # FLD: 12.3 % — SIGNIFICANT CHANGE UP (ref 10.3–16.9)
SODIUM SERPL-SCNC: 133 MMOL/L — LOW (ref 135–145)
VANCOMYCIN TROUGH SERPL-MCNC: 10.1 UG/ML — SIGNIFICANT CHANGE UP (ref 10–20)
WBC # BLD: 9.7 K/UL — SIGNIFICANT CHANGE UP (ref 3.8–10.5)
WBC # FLD AUTO: 9.7 K/UL — SIGNIFICANT CHANGE UP (ref 3.8–10.5)

## 2019-01-09 PROCEDURE — 99222 1ST HOSP IP/OBS MODERATE 55: CPT | Mod: GC

## 2019-01-09 PROCEDURE — 93312 ECHO TRANSESOPHAGEAL: CPT | Mod: 26

## 2019-01-09 PROCEDURE — 93320 DOPPLER ECHO COMPLETE: CPT | Mod: 26

## 2019-01-09 PROCEDURE — 93325 DOPPLER ECHO COLOR FLOW MAPG: CPT | Mod: 26

## 2019-01-09 PROCEDURE — 71045 X-RAY EXAM CHEST 1 VIEW: CPT | Mod: 26

## 2019-01-09 PROCEDURE — 70450 CT HEAD/BRAIN W/O DYE: CPT | Mod: 26

## 2019-01-09 PROCEDURE — 74018 RADEX ABDOMEN 1 VIEW: CPT | Mod: 26

## 2019-01-09 PROCEDURE — 99232 SBSQ HOSP IP/OBS MODERATE 35: CPT

## 2019-01-09 RX ORDER — POLYETHYLENE GLYCOL 3350 17 G/17G
17 POWDER, FOR SOLUTION ORAL DAILY
Qty: 0 | Refills: 0 | Status: DISCONTINUED | OUTPATIENT
Start: 2019-01-09 | End: 2019-01-15

## 2019-01-09 RX ORDER — VANCOMYCIN HCL 1 G
1250 VIAL (EA) INTRAVENOUS EVERY 12 HOURS
Qty: 0 | Refills: 0 | Status: DISCONTINUED | OUTPATIENT
Start: 2019-01-09 | End: 2019-01-09

## 2019-01-09 RX ORDER — DOCUSATE SODIUM 100 MG
100 CAPSULE ORAL THREE TIMES A DAY
Qty: 0 | Refills: 0 | Status: DISCONTINUED | OUTPATIENT
Start: 2019-01-09 | End: 2019-01-15

## 2019-01-09 RX ORDER — VANCOMYCIN HCL 1 G
1250 VIAL (EA) INTRAVENOUS EVERY 12 HOURS
Qty: 0 | Refills: 0 | Status: DISCONTINUED | OUTPATIENT
Start: 2019-01-09 | End: 2019-01-10

## 2019-01-09 RX ORDER — SENNA PLUS 8.6 MG/1
2 TABLET ORAL AT BEDTIME
Qty: 0 | Refills: 0 | Status: DISCONTINUED | OUTPATIENT
Start: 2019-01-09 | End: 2019-01-15

## 2019-01-09 RX ADMIN — Medication 0.5 MILLIGRAM(S): at 06:54

## 2019-01-09 RX ADMIN — SODIUM CHLORIDE 100 MILLILITER(S): 9 INJECTION, SOLUTION INTRAVENOUS at 01:10

## 2019-01-09 RX ADMIN — CEFEPIME 100 MILLIGRAM(S): 1 INJECTION, POWDER, FOR SOLUTION INTRAMUSCULAR; INTRAVENOUS at 09:36

## 2019-01-09 RX ADMIN — CEFEPIME 100 MILLIGRAM(S): 1 INJECTION, POWDER, FOR SOLUTION INTRAMUSCULAR; INTRAVENOUS at 01:11

## 2019-01-09 RX ADMIN — Medication 166.67 MILLIGRAM(S): at 18:57

## 2019-01-09 NOTE — PROGRESS NOTE ADULT - SUBJECTIVE AND OBJECTIVE BOX
Chief Complaint/Reason for Consult: r/o endo  INTERVAL HPI: carson results reviewed, no veg BCx NGTD  	  MEDICATIONS:    cefepime   IVPB 2000 milliGRAM(s) IV Intermittent every 8 hours  vancomycin  IVPB 1250 milliGRAM(s) IV Intermittent every 12 hours      HYDROmorphone PCA (1 mG/mL) 30 milliLiter(s) PCA Continuous PCA Continuous  LORazepam   Injectable 0.5 milliGRAM(s) IV Push every 12 hours  ondansetron Injectable 4 milliGRAM(s) IV Push every 6 hours PRN        lactated ringers. 1000 milliLiter(s) IV Continuous <Continuous>      REVIEW OF SYSTEMS:  [x] As per HPI  CONSTITUTIONAL: No fever, weight loss, or fatigue  RESPIRATORY: No cough, wheezing, chills or hemoptysis; No Shortness of Breath  CARDIOVASCULAR: No chest pain, palpitations, dizziness, or leg swelling  GASTROINTESTINAL: No abdominal or epigastric pain. No nausea, vomiting, or hematemesis; No diarrhea or constipation. No melena or hematochezia.  MUSCULOSKELETAL: No joint pain or swelling; No muscle, back, or extremity pain  [x] All others negative	  [ ] Unable to obtain    PHYSICAL EXAM:  T(C): 37.8 (01-09-19 @ 09:03), Max: 37.8 (01-09-19 @ 09:03)  HR: 100 (01-09-19 @ 09:03) (92 - 108)  BP: 119/62 (01-09-19 @ 09:03) (100/58 - 131/74)  RR: 16 (01-09-19 @ 09:03) (12 - 20)  SpO2: 98% (01-09-19 @ 09:03) (96% - 100%)  Wt(kg): --  I&O's Summary    08 Jan 2019 07:01  -  09 Jan 2019 07:00  --------------------------------------------------------  IN: 2550 mL / OUT: 3295 mL / NET: -745 mL    09 Jan 2019 07:01  -  09 Jan 2019 13:44  --------------------------------------------------------  IN: 0 mL / OUT: 300 mL / NET: -300 mL          Appearance: Normal	  HEENT:   Normal oral mucosa  Cardiovascular: Normal S1 S2, No JVD, No murmurs, No edema  Respiratory: Lungs clear to auscultation	  Gastrointestinal:  Soft, Non-tender, + BS	  Extremities: Normal range of motion, No clubbing, cyanosis or edema  Vascular: Peripheral pulses palpable 2+ bilaterally    TELEMETRY: 	    ECG:   	  RADIOLOGY:   CXR:  CT:  US:    CARDIAC TESTING:  Echocardiogram:  Catheterization:  Stress Test:      LABS:	 	    CARDIAC MARKERS:                                  10.6   9.7   )-----------( 133      ( 09 Jan 2019 05:40 )             31.3     01-09    133<L>  |  97  |  8   ----------------------------<  127<H>  3.9   |  23  |  0.63    Ca    8.1<L>      09 Jan 2019 05:40  Phos  2.3     01-09  Mg     1.9     01-09    TPro  5.0<L>  /  Alb  2.8<L>  /  TBili  0.7  /  DBili  x   /  AST  27  /  ALT  20  /  AlkPhos  52  01-08    proBNP:   Lipid Profile:   HgA1c:   TSH:     ASSESSMENT/PLAN: 	    #r/o endocarditis - will base Rx on modified dukes criteria  Hep C, IVDU  2 sets of blood cultures under sterile technique  -NGTD  Echo - CARSON no veg  ID consultation  carson results reviewed, no veg BCx NGTD  ABx per ID recs

## 2019-01-09 NOTE — PROGRESS NOTE ADULT - SUBJECTIVE AND OBJECTIVE BOX
Ortho Note    Pt was stepped down from SICU to tele. No new issues. Awake and oriented. Bilateral lower leg dopplers negative. ECHO suggestive of vegetations and KRISTINE order noted. Awaiting ID eval. Pain well controlled  Denies CP, SOB, N/V, numbness/tingling     Vital Signs Last 24 Hrs  T(C): 37.8 (01-09-19 @ 09:03), Max: 37.8 (01-09-19 @ 09:03)  T(F): 100 (01-09-19 @ 09:03), Max: 100 (01-09-19 @ 09:03)  HR: 100 (01-09-19 @ 09:03) (100 - 108)  BP: 119/62 (01-09-19 @ 09:03) (100/58 - 119/62)  BP(mean): --  RR: 16 (01-09-19 @ 09:03) (16 - 16)  SpO2: 98% (01-09-19 @ 09:03) (98% - 100%)    Awake and oriented, NAD  Back DSG C/D/I, HV x2 in place and holding drain  Pulses: 2+ DP/PT bilaterally   Sensation: s/s/sp/dp/t intact bilaterally   Motor: EHL/FHL/TA/GS 5/5 bilaterally  Bilateral LE mild swelling, toes WWP                          10.6   9.7   )-----------( 133      ( 09 Jan 2019 05:40 )             31.3   09 Jan 2019 05:40    133    |  97     |  8      ----------------------------<  127    3.9     |  23     |  0.63     Ca    8.1        09 Jan 2019 05:40  Phos  2.3       09 Jan 2019 05:40  Mg     1.9       09 Jan 2019 05:40    TPro  5.0    /  Alb  2.8    /  TBili  0.7    /  DBili  x      /  AST  27     /  ALT  20     /  AlkPhos  52     08 Jan 2019 00:18    Drain output:    01-08-19 @ 07:01  -  01-09-19 @ 07:00  --------------------------------------------------------  OUT:    Accordian: 240 mL    Accordian: 300 mL  Total OUT: 540 mL    A/P: 53M POD#2 s/p ROAS, exploration of fusion, revision laminectomy, primary laminectomy L2-L4, re-instrumentation of posterior spinal fusion L1-sacrum   - Stable  - Pain Control  - DVT ppx: SCDs  - cont to monitor drains  - cont vanc and cefepime  - PT, WBS: WBAT  - f/u ID  - f/u KRISTINE  - dispo pending     Ortho Pager [phone]

## 2019-01-09 NOTE — PROGRESS NOTE ADULT - SUBJECTIVE AND OBJECTIVE BOX
Pain Management Progress Note - [facility] [phone]    HPI: Patient seen and examined today, patient laying down in bed, in no apparent distress. Patient lethargic as per nursing staff but easy to arouse, normotensive, denies n,v. Pt with Dilaudid PCA limited use overnight. Discussed plan with orthopedics team to continue Dilaudid PCA today.        Pain is ___ sharp _x___dull ___burning _x__achy ___ Intensity: ____ mild _x__mod _x__severe     Location _x___surgical site ____cervical __x___lumbar ____abd ____upper ext____lower ext    Worse with __x__activity __x__movement _____physical therapy___ Rest    Improved with __x__medication __x__rest ____physical therapy      PAST MEDICAL & SURGICAL HISTORY:  Depression  MS (multiple sclerosis)  Hepatitis C  Surgery, elective: clavical sx  Surgery, elective: left ankle 2012  Surgery, elective: spinal fusion x 2  lumbar  Depression  COPD (chronic obstructive pulmonary disease)  MS (multiple sclerosis)  Hepatitis C  Spinal stenosis of lumbar region, unspecified whether neurogenic claudication present  Spinal fusion failure, sequela  Other kyphosis of thoracolumbar region  Spinal fusion failure, sequela  Spinal stenosis of lumbar region, unspecified whether neurogenic claudication present  Other kyphosis of thoracolumbar region  Laminectomy of lumbar spine 3 or more levels with exploration or decompression or both  Spinal stenosis of lumbosacral region  MS (multiple sclerosis)  Depression  Hepatitis C  Spinal stenosis of lumbosacral region  Laminectomy of lumbar spine 3 or more levels with exploration or decompression or both  Surgery, elective      BUpivacaine liposome 1.3% Injectable (no eMAR)  oxyCODONE  ER Tablet  oxyCODONE    IR  HYDROmorphone  Injectable  acetaminophen   Tablet ..  HYDROmorphone  Injectable  lactated ringers.  vancomycin  IVPB  HYDROmorphone  Injectable  HYDROmorphone PCA (1 mG/mL)  HYDROmorphone PCA (1 mG/mL) Rescue Clinician Bolus  naloxone Injectable  ondansetron Injectable  HYDROmorphone  Injectable  HYDROmorphone  Injectable  ALPRAZolam  HYDROmorphone PCA (1 mG/mL) Rescue Clinician Bolus  naloxone Injectable  norepinephrine Infusion  vancomycin  IVPB  cefepime   IVPB  magnesium sulfate  IVPB  acetaminophen  IVPB ..  LORazepam   Injectable  HYDROmorphone PCA (1 mG/mL)  naloxone Injectable  ondansetron Injectable  vancomycin  IVPB      ROS: Const:  _-__febrile   Eyes:___ENT:___CV: _-__chest pain  Resp: __-__sob  GI:_-__nausea _-__vomiting _-__abd pain ___npo ___clears x__full diet __bm  :___ [place]: _x__pain _x__spasm  Skin:___ Neuro:  _-__vlejwaqd_-__elxjimkao_-__ numbness _x__weakness _-__paresth  Psych:-__anxiety  Endo:___ Heme:___Allergy:_________, _x__all others reviewed and negative        01-09 @ 05:80969 mL/min/1.73M2      01-08 @ 11:25656 mL/min/1.73M2      Hemoglobin: 10.6 g/dL (01-09 @ 05:40)  Hemoglobin: 11.2 g/dL (01-08 @ 11:19)  Hemoglobin: 12.1 g/dL (01-08 @ 00:18)  Hemoglobin: 11.8 g/dL (01-07 @ 23:53)        T(C): 37.8 (01-09-19 @ 09:03), Max: 37.8 (01-09-19 @ 09:03)  HR: 100 (01-09-19 @ 09:03) (88 - 108)  BP: 119/62 (01-09-19 @ 09:03) (98/56 - 131/74)  RR: 16 (01-09-19 @ 09:03) (12 - 23)  SpO2: 98% (01-09-19 @ 09:03) (96% - 100%)  Wt(kg): --        PHYSICAL EXAM:  Gen Appearance: _x__no acute distress __x_appropriate        Neuro: ___SILT feet____ EOM Intact Psych: AAOX_3_, _x__mood/affect appropriate        Eyes: _x__conjunctiva WNL  __x___ Pupils equal and round        ENT: __x_ears and nose atraumatic_x__ Hearing grossly intact        Neck: _x__trachea midline, no visible masses ___thyroid without palpable mass    Resp: _x__Nml WOB____No tactile fremitus ___clear to auscultation    Cardio: _x__extremities free from edema __x__pedal pulses palpable    GI/Abdomen: _x__soft __x___ Nontender____x__Nondistended_____HSM    Lymphatic: ___no palpable nodes in neck  ___no palpable nodes calves and feet    Skin/Wound: _x__Incision, _x__Dressing c/d/i,   _x___surrounding tissues soft,  _x__drain/chest tube present____    Muscular: EHL _5__/5  Gastrocnemius_5__/5    ___absent clubbing/cyanosis        ASSESSMENT: This is a 53y old Male with a history of spinal stenosis and spinal fusion, s/p laminectomy fusion, post op day 2, pain managed with Dilaudid PCA.       Recommended Treatment PLAN:  1. Dilaudid PCA 0.3mg, Q6 minutes 12mg 4 hour max   Plan discussed with Jose Eduardo Biggs

## 2019-01-09 NOTE — CONSULT NOTE ADULT - SUBJECTIVE AND OBJECTIVE BOX
HPI:  53M s/p Laminectomy fusion c/o low back pain x > 1 year.  Pt states back pain occasionally radiates down LE b/l but denies numbness or tingling down both lower extremities.  Pt currently takes 60mg MS Contin, 30mg oxycodone for his pain and states his pain is not well controlled.  Pt notes they have a hx of leg swelling which has been chronic in nature.  Pt ambulates independently.  Pt states they are lethargic today secondary to waking up early this morning for surgery.   Pt denies fever, chills, recent illness, CP, SOB, or being on any anticoagulation medication.    Pt has failed conservative treatment for his symptoms     Presents for elective exploration of spinal fusion, revision TLIF L1-Sacrum with cage implantation. Procedure was done on 1/07/2019.  Post-op patient was noted to be lethargic.  Pt states they are lethargic today secondary to waking up early this morning for surgery.   Rapid response team called because patient was received post op and hypotensive. It was believed he was lethargic due to opiate use inpatient.  He was transferred to SICU for observation and work up.  He was noted to be febrile.  Blood cultures were done and showed no growth to date.  A TTE was done and showed possible vegetation.  A KRISTINE was done and did not show any vegetations.  Patient has continued on Cefepime and Vancomycin.     He reports he feels "lousy" but denies any specific symptoms.        PAST MEDICAL & SURGICAL HISTORY:  Depression  MS (multiple sclerosis)  Hepatitis C  Surgery, elective: clavical sx  Surgery, elective: left ankle 2012  Surgery, elective: spinal fusion x 2  lumbar        REVIEW OF SYSTEMS:    General:	 no weakness; no fevers, no chills  Skin/Breast: no rash  Respiratory and Thorax: no SOB, no cough  Cardiovascular:	No chest pain  Gastrointestinal:	 no nausea, vomiting , diarrhea  Genitourinary:	no dysuria, no difficulty urinating, no hematuria  Musculoskeletal:	no weakness, no joint swelling/pain  Neurological:	no focal weakness/numbness  Endocrine:	no polyuria, no polydipsia      ANTIBIOTICS:  MEDICATIONS  (STANDING):  docusate sodium 100 milliGRAM(s) Oral three times a day  HYDROmorphone PCA (1 mG/mL) 30 milliLiter(s) PCA Continuous PCA Continuous  LORazepam   Injectable 0.5 milliGRAM(s) IV Push every 12 hours  polyethylene glycol 3350 17 Gram(s) Oral daily  senna 2 Tablet(s) Oral at bedtime  vancomycin  IVPB 1250 milliGRAM(s) IV Intermittent every 12 hours    MEDICATIONS  (PRN):  bisacodyl 5 milliGRAM(s) Oral every 12 hours PRN Constipation  naloxone Injectable 0.1 milliGRAM(s) IV Push every 3 minutes PRN For ANY of the following changes in patient status:  A. RR LESS THAN 10 breaths per minute, B. Oxygen saturation LESS THAN 90%, C. Sedation score of 6  ondansetron Injectable 4 milliGRAM(s) IV Push every 6 hours PRN Nausea      Allergies    No Known Allergies    Intolerances    erythromycin (Stomach Upset)      SOCIAL HISTORY:    FAMILY HISTORY:      Vital Signs Last 24 Hrs  T(C): 37.4 (09 Jan 2019 13:46), Max: 37.8 (09 Jan 2019 09:03)  T(F): 99.3 (09 Jan 2019 13:46), Max: 100 (09 Jan 2019 09:03)  HR: 91 (09 Jan 2019 13:46) (91 - 108)  BP: 99/51 (09 Jan 2019 13:46) (99/51 - 119/77)  BP(mean): --  RR: 17 (09 Jan 2019 13:46) (16 - 18)  SpO2: 96% (09 Jan 2019 13:46) (96% - 100%)    PHYSICAL EXAM:  Constitutional:  non-toxic, no distress  Eyes: no icterus   Ear/Nose/Throat:  no sinus tenderness on percussion	  Neck:no JVD, no lymphadenopathy, supple  Respiratory: clear to auscultation   Cardiovascular: S1S2 RRR, no murmurs  Gastrointestinal:soft, (+) BS, no HSM  Extremities:no edema   Vascular: DP Pulse:	right normal; left normal            LABS:                        10.6   9.7   )-----------( 133      ( 09 Jan 2019 05:40 )             31.3     01-09    133<L>  |  97  |  8   ----------------------------<  127<H>  3.9   |  23  |  0.63    Ca    8.1<L>      09 Jan 2019 05:40  Phos  2.3     01-09  Mg     1.9     01-09    TPro  5.0<L>  /  Alb  2.8<L>  /  TBili  0.7  /  DBili  x   /  AST  27  /  ALT  20  /  AlkPhos  52  01-08      Urinalysis Basic - ( 08 Jan 2019 01:32 )    Color: Yellow / Appearance: Clear / SG: <=1.005 / pH: x  Gluc: x / Ketone: NEGATIVE  / Bili: Negative / Urobili: 0.2 E.U./dL   Blood: x / Protein: NEGATIVE mg/dL / Nitrite: NEGATIVE   Leuk Esterase: NEGATIVE / RBC: < 5 /HPF / WBC < 5 /HPF   Sq Epi: x / Non Sq Epi: 0-5 /HPF / Bacteria: Present /HPF        MICROBIOLOGY:    Culture - Blood (01.08.19 @ 17:20)    Specimen Source: .Blood Blood    Culture Results:   No growth at 12 hours    Culture - Blood (01.08.19 @ 01:55)    Specimen Source: .Blood None    Culture Results:   No growth at 1 day.      RADIOLOGY & ADDITIONAL STUDIES:    < from: KRISTINE w/Doppler (01.09.19 @ 12:40) >  KRISTINE performed to evaluate for endocarditis. Technically difficult study.   The   left ventricular wall motion is normal.The left ventricular ejection   fraction   is normal.The left ventricular ejection fraction is 65%.The right   ventricle is   normal in size and function.The left atrial size is normal.No clot seen   in the   left atrium or in the left atrial appendage.Right atrial size is   normal.Minimally thickened trileaflet aortic valve.No aortic   regurgitation   noted.Structurally normal mitral valve.No mitral regurgitation   noted.Structurally normal tricuspid valve.There is trace tricuspid   regurgitation.Structurally normal pulmonic valve.There is trace pulmonic   regurgitation.There is no pericardial effusion.Mild, non-mobile plaque   is seen   in the visualized portions of thedescending aorta. The aortic arch was   not   well seen.  No echocardiographic of vegetations seen. Copious biliary   secretions came up at the end of the procedure.Dr. Chery discussed above   findings with the primary team on 1/9/19

## 2019-01-09 NOTE — PROGRESS NOTE ADULT - SUBJECTIVE AND OBJECTIVE BOX
ORTHO NOTE    [x ] Pt seen/examined at 8:40am  [x ] Pt without any complaints/in NAD.  Patient resting comfortably on his left side with bilateral knees flexed    [ ] Pt complains of:      ROS: [ ] Fever  [ ] Chills  [ ] CP [ ] SOB [ ] Dysnea  [ ] Palpitations [ ] Cough [ ] N/V/C/D [ ] Paresthia [ ] Other     [x ] ROS  otherwise negative    .    PHYSICAL EXAM:    Vital Signs Last 24 Hrs  T(C): 37.8 (09 Jan 2019 09:03), Max: 37.8 (09 Jan 2019 09:03)  T(F): 100 (09 Jan 2019 09:03), Max: 100 (09 Jan 2019 09:03)  HR: 100 (09 Jan 2019 09:03) (92 - 108)  BP: 119/62 (09 Jan 2019 09:03) (100/58 - 131/74)  BP(mean): 80 (08 Jan 2019 17:00) (80 - 86)  RR: 16 (09 Jan 2019 09:03) (12 - 20)  SpO2: 98% (09 Jan 2019 09:03) (96% - 100%)    I&O's Detail    08 Jan 2019 07:01  -  09 Jan 2019 07:00  --------------------------------------------------------  IN:    IV PiggyBack: 350 mL    lactated ringers.: 2200 mL  Total IN: 2550 mL    OUT:    Accordian: 240 mL    Accordian: 300 mL    Indwelling Catheter - Urethral: 2755 mL  Total OUT: 3295 mL    Total NET: -745 mL      09 Jan 2019 07:01  -  09 Jan 2019 13:32  --------------------------------------------------------  IN:  Total IN: 0 mL    OUT:    Indwelling Catheter - Urethral: 300 mL  Total OUT: 300 mL    Total NET: -300 mL           CAPILLARY BLOOD GLUCOSE                      Neuro: AAOX3    Lungs: nonlabored, Spo2 wnl on RA    CV: normotensive, HR 90s    ABD: soft, nontender    Ext:  demonstrated log rolling  bilateral LE motor: 5/5  bilateral LE sensation: nvid     LABS                        10.6   9.7   )-----------( 133      ( 09 Jan 2019 05:40 )             31.3                                01-09    133<L>  |  97  |  8   ----------------------------<  127<H>  3.9   |  23  |  0.63    Ca    8.1<L>      09 Jan 2019 05:40  Phos  2.3     01-09  Mg     1.9     01-09    TPro  5.0<L>  /  Alb  2.8<L>  /  TBili  0.7  /  DBili  x   /  AST  27  /  ALT  20  /  AlkPhos  52  01-08      [ ] Other Labs  [ ] None ordered            Please check or [place] when present:  •  Heart Failure:    [ ] Acute        [ ]  Acute on Chronic        [ ] Chronic         [ ] Diastolic     [ ]  Combined    •  MARY:     [ ] ATN        [ ]  Renal medullary necrosis       [ ]  Renal cortical necrosis                  [ ] Other pathological Lesion:  •  CKD:  [ ] Stage I   [ ] Stage II  [ ] Stage III    [ ]Stage IV   [ ]  CKD V   [ ]  Other/Unspecified:    •  Abdominal Nutritional Status:   [ ] Malnutrition-See Nutrition note    [ ] Cachexia   [ ]  Other        [ ] Supplement ordered:            [ ] Morbid Obesity: BMI >=40         ASSESSMENT/PLAN:      STATUS POST: pod2 L2-L4 laminectomy, psf L1-pelvis  posterior betadine dressing cdi.  HV x2 continue on suction to monitor drainage  h/h stable  KRISTINE- no endocarditis.  Echo team reported secretions CXR to r/o aspiration.  Echo team reported seeing biliary content.  ABD xr ordered.  F/u xr results with radiology prior to advancing diet.  NPO  pain control per pain mngt team with PCA implemented  bowel regimen  encouraged ambulation     CONTINUE:          [ ] PT- wbat    [ ] DVT PPX- scd    [ ] Pain Mgt- pca per pain mngt team    [ ] Dispo plan- pending PT evaluation ORTHO NOTE    [x ] Pt seen/examined at 8:40am  [x ] Pt without any complaints/in NAD.  Patient resting comfortably on his left side with bilateral knees flexed    [ ] Pt complains of:      ROS: [ ] Fever  [ ] Chills  [ ] CP [ ] SOB [ ] Dysnea  [ ] Palpitations [ ] Cough [ ] N/V/C/D [ ] Paresthia [ ] Other     [x ] ROS  otherwise negative    .    PHYSICAL EXAM:    Vital Signs Last 24 Hrs  T(C): 37.8 (09 Jan 2019 09:03), Max: 37.8 (09 Jan 2019 09:03)  T(F): 100 (09 Jan 2019 09:03), Max: 100 (09 Jan 2019 09:03)  HR: 100 (09 Jan 2019 09:03) (92 - 108)  BP: 119/62 (09 Jan 2019 09:03) (100/58 - 131/74)  BP(mean): 80 (08 Jan 2019 17:00) (80 - 86)  RR: 16 (09 Jan 2019 09:03) (12 - 20)  SpO2: 98% (09 Jan 2019 09:03) (96% - 100%)    I&O's Detail    08 Jan 2019 07:01  -  09 Jan 2019 07:00  --------------------------------------------------------  IN:    IV PiggyBack: 350 mL    lactated ringers.: 2200 mL  Total IN: 2550 mL    OUT:    Accordian: 240 mL    Accordian: 300 mL    Indwelling Catheter - Urethral: 2755 mL  Total OUT: 3295 mL    Total NET: -745 mL      09 Jan 2019 07:01  -  09 Jan 2019 13:32  --------------------------------------------------------  IN:  Total IN: 0 mL    OUT:    Indwelling Catheter - Urethral: 300 mL  Total OUT: 300 mL    Total NET: -300 mL           CAPILLARY BLOOD GLUCOSE                      Neuro: AAOX3    Lungs: nonlabored, Spo2 wnl on RA    CV: normotensive, HR 90s    ABD: soft, nontender    Ext:  demonstrated log rolling  bilateral LE motor: 5/5 EHL/FHL/TA/GS 5/5   Bilateral LE mild swelling, toes WWP  bilateral LE sensation: nvid     LABS                        10.6   9.7   )-----------( 133      ( 09 Jan 2019 05:40 )             31.3                                01-09    133<L>  |  97  |  8   ----------------------------<  127<H>  3.9   |  23  |  0.63    Ca    8.1<L>      09 Jan 2019 05:40  Phos  2.3     01-09  Mg     1.9     01-09    TPro  5.0<L>  /  Alb  2.8<L>  /  TBili  0.7  /  DBili  x   /  AST  27  /  ALT  20  /  AlkPhos  52  01-08      [ ] Other Labs  [ ] None ordered            Please check or [place] when present:  •  Heart Failure:    [ ] Acute        [ ]  Acute on Chronic        [ ] Chronic         [ ] Diastolic     [ ]  Combined    •  MARY:     [ ] ATN        [ ]  Renal medullary necrosis       [ ]  Renal cortical necrosis                  [ ] Other pathological Lesion:  •  CKD:  [ ] Stage I   [ ] Stage II  [ ] Stage III    [ ]Stage IV   [ ]  CKD V   [ ]  Other/Unspecified:    •  Abdominal Nutritional Status:   [ ] Malnutrition-See Nutrition note    [ ] Cachexia   [ ]  Other        [ ] Supplement ordered:            [ ] Morbid Obesity: BMI >=40         ASSESSMENT/PLAN:      STATUS POST: pod2 L2-L4 laminectomy, psf L1-pelvis  posterior betadine dressing cdi.  HV x2 continue on suction to monitor drainage  h/h stable  KRISTINE- no endocarditis.  Echo team reported secretions CXR to r/o aspiration.  Echo team reported seeing biliary content.  ABD xr ordered.  F/u xr results with radiology prior to advancing diet.  NPO  pain control per pain mngt team with PCA implemented  bowel regimen  encouraged ambulation     CONTINUE:          [ ] PT- wbat    [ ] DVT PPX- scd    [ ] Pain Mgt- pca per pain mngt team    [ ] Dispo plan- pending PT evaluation ORTHO NOTE    [x ] Pt seen/examined at 8:40am  [x ] Pt without any complaints/in NAD.  Patient resting comfortably on his left side with bilateral knees flexed    [ ] Pt complains of:      ROS: [ ] Fever  [ ] Chills  [ ] CP [ ] SOB [ ] Dysnea  [ ] Palpitations [ ] Cough [ ] N/V/C/D [ ] Paresthia [ ] Other     [x ] ROS  otherwise negative    .    PHYSICAL EXAM:    Vital Signs Last 24 Hrs  T(C): 37.8 (09 Jan 2019 09:03), Max: 37.8 (09 Jan 2019 09:03)  T(F): 100 (09 Jan 2019 09:03), Max: 100 (09 Jan 2019 09:03)  HR: 100 (09 Jan 2019 09:03) (92 - 108)  BP: 119/62 (09 Jan 2019 09:03) (100/58 - 131/74)  BP(mean): 80 (08 Jan 2019 17:00) (80 - 86)  RR: 16 (09 Jan 2019 09:03) (12 - 20)  SpO2: 98% (09 Jan 2019 09:03) (96% - 100%)    I&O's Detail    08 Jan 2019 07:01  -  09 Jan 2019 07:00  --------------------------------------------------------  IN:    IV PiggyBack: 350 mL    lactated ringers.: 2200 mL  Total IN: 2550 mL    OUT:    Accordian: 240 mL    Accordian: 300 mL    Indwelling Catheter - Urethral: 2755 mL  Total OUT: 3295 mL    Total NET: -745 mL      09 Jan 2019 07:01  -  09 Jan 2019 13:32  --------------------------------------------------------  IN:  Total IN: 0 mL    OUT:    Indwelling Catheter - Urethral: 300 mL  Total OUT: 300 mL    Total NET: -300 mL           CAPILLARY BLOOD GLUCOSE                      Neuro: AAOX3    Lungs: nonlabored, Spo2 wnl on RA    CV: normotensive, HR 90s    ABD: soft, nontender    Ext:  demonstrated log rolling  bilateral LE motor: 5/5 EHL/FHL/TA/GS 5/5   Bilateral LE mild swelling, toes WWP  bilateral LE sensation: nvid     LABS                        10.6   9.7   )-----------( 133      ( 09 Jan 2019 05:40 )             31.3                                01-09    133<L>  |  97  |  8   ----------------------------<  127<H>  3.9   |  23  |  0.63    Ca    8.1<L>      09 Jan 2019 05:40  Phos  2.3     01-09  Mg     1.9     01-09    TPro  5.0<L>  /  Alb  2.8<L>  /  TBili  0.7  /  DBili  x   /  AST  27  /  ALT  20  /  AlkPhos  52  01-08      [ ] Other Labs  [ ] None ordered            Please check or [place] when present:  •  Heart Failure:    [ ] Acute        [ ]  Acute on Chronic        [ ] Chronic         [ ] Diastolic     [ ]  Combined    •  MARY:     [ ] ATN        [ ]  Renal medullary necrosis       [ ]  Renal cortical necrosis                  [ ] Other pathological Lesion:  •  CKD:  [ ] Stage I   [ ] Stage II  [ ] Stage III    [ ]Stage IV   [ ]  CKD V   [ ]  Other/Unspecified:    •  Abdominal Nutritional Status:   [ ] Malnutrition-See Nutrition note    [ ] Cachexia   [ ]  Other        [ ] Supplement ordered:            [ ] Morbid Obesity: BMI >=40         ASSESSMENT/PLAN:      STATUS POST: pod2 ROSA, exploration of fusion, revision laminectomy, primary laminectomy L2-L4, re-instrumentation of posterior spinal fusion L1-sacrum   posterior betadine dressing cdi.  HV x2 continue on suction to monitor drainage  h/h stable  KRISTINE- no endocarditis.  Echo team reported secretions CXR to r/o aspiration.  Echo team reported seeing biliary content.  ABD xr ordered.  F/u xr results with radiology prior to advancing diet.  NPO  pain control per pain mngt team with PCA implemented  bowel regimen  encouraged ambulation     CONTINUE:          [ ] PT- wbat    [ ] DVT PPX- scd    [ ] Pain Mgt- pca per pain mngt team    [ ] Dispo plan- pending PT evaluation

## 2019-01-09 NOTE — CONSULT NOTE ADULT - ASSESSMENT
IMPRESSION:  Patient does not meet criteria for endocarditis.  KRISTINE shows no vegetations and blood cultures are negative (although it appears he was started on vancomycin just prior first set of blood cultures).  Repeat blood cultures during hypotension are showing no growth.  He has no focal complaints and has a normal WBC.  It's possible fevers could be due to aspiration pneumonitis vs atelectasis    Recommend:  1.  From ID standpoint, can stop vancomycin and cefepime and monitor clinically  2.  Follow up blood cultures to completion     ID will sign off.  Reconsult as needed

## 2019-01-10 LAB
ANION GAP SERPL CALC-SCNC: 11 MMOL/L — SIGNIFICANT CHANGE UP (ref 5–17)
BUN SERPL-MCNC: 9 MG/DL — SIGNIFICANT CHANGE UP (ref 7–23)
CALCIUM SERPL-MCNC: 8.3 MG/DL — LOW (ref 8.4–10.5)
CHLORIDE SERPL-SCNC: 99 MMOL/L — SIGNIFICANT CHANGE UP (ref 96–108)
CO2 SERPL-SCNC: 25 MMOL/L — SIGNIFICANT CHANGE UP (ref 22–31)
CREAT SERPL-MCNC: 0.57 MG/DL — SIGNIFICANT CHANGE UP (ref 0.5–1.3)
GLUCOSE SERPL-MCNC: 122 MG/DL — HIGH (ref 70–99)
HCT VFR BLD CALC: 27.6 % — LOW (ref 39–50)
HGB BLD-MCNC: 9.4 G/DL — LOW (ref 13–17)
MAGNESIUM SERPL-MCNC: 1.7 MG/DL — SIGNIFICANT CHANGE UP (ref 1.6–2.6)
MCHC RBC-ENTMCNC: 31.6 PG — SIGNIFICANT CHANGE UP (ref 27–34)
MCHC RBC-ENTMCNC: 34.1 G/DL — SIGNIFICANT CHANGE UP (ref 32–36)
MCV RBC AUTO: 92.9 FL — SIGNIFICANT CHANGE UP (ref 80–100)
PHOSPHATE SERPL-MCNC: 2.1 MG/DL — LOW (ref 2.5–4.5)
PLATELET # BLD AUTO: 132 K/UL — LOW (ref 150–400)
POTASSIUM SERPL-MCNC: 3.6 MMOL/L — SIGNIFICANT CHANGE UP (ref 3.5–5.3)
POTASSIUM SERPL-SCNC: 3.6 MMOL/L — SIGNIFICANT CHANGE UP (ref 3.5–5.3)
RBC # BLD: 2.97 M/UL — LOW (ref 4.2–5.8)
RBC # FLD: 11.8 % — SIGNIFICANT CHANGE UP (ref 10.3–16.9)
SODIUM SERPL-SCNC: 135 MMOL/L — SIGNIFICANT CHANGE UP (ref 135–145)
VANCOMYCIN TROUGH SERPL-MCNC: 8.5 UG/ML — LOW (ref 10–20)
WBC # BLD: 7.3 K/UL — SIGNIFICANT CHANGE UP (ref 3.8–10.5)
WBC # FLD AUTO: 7.3 K/UL — SIGNIFICANT CHANGE UP (ref 3.8–10.5)

## 2019-01-10 RX ORDER — OXYCODONE HYDROCHLORIDE 5 MG/1
60 TABLET ORAL EVERY 8 HOURS
Qty: 0 | Refills: 0 | Status: DISCONTINUED | OUTPATIENT
Start: 2019-01-10 | End: 2019-01-13

## 2019-01-10 RX ORDER — VANCOMYCIN HCL 1 G
1500 VIAL (EA) INTRAVENOUS EVERY 12 HOURS
Qty: 0 | Refills: 0 | Status: DISCONTINUED | OUTPATIENT
Start: 2019-01-10 | End: 2019-01-13

## 2019-01-10 RX ORDER — SODIUM CHLORIDE 9 MG/ML
500 INJECTION INTRAMUSCULAR; INTRAVENOUS; SUBCUTANEOUS ONCE
Qty: 0 | Refills: 0 | Status: COMPLETED | OUTPATIENT
Start: 2019-01-10 | End: 2019-01-10

## 2019-01-10 RX ORDER — OXYCODONE HYDROCHLORIDE 5 MG/1
30 TABLET ORAL EVERY 4 HOURS
Qty: 0 | Refills: 0 | Status: DISCONTINUED | OUTPATIENT
Start: 2019-01-10 | End: 2019-01-13

## 2019-01-10 RX ORDER — HYDROMORPHONE HYDROCHLORIDE 2 MG/ML
1 INJECTION INTRAMUSCULAR; INTRAVENOUS; SUBCUTANEOUS
Qty: 0 | Refills: 0 | Status: DISCONTINUED | OUTPATIENT
Start: 2019-01-10 | End: 2019-01-15

## 2019-01-10 RX ORDER — ALPRAZOLAM 0.25 MG
2 TABLET ORAL
Qty: 0 | Refills: 0 | Status: DISCONTINUED | OUTPATIENT
Start: 2019-01-10 | End: 2019-01-15

## 2019-01-10 RX ADMIN — HYDROMORPHONE HYDROCHLORIDE 30 MILLILITER(S): 2 INJECTION INTRAMUSCULAR; INTRAVENOUS; SUBCUTANEOUS at 04:23

## 2019-01-10 RX ADMIN — Medication 166.67 MILLIGRAM(S): at 06:09

## 2019-01-10 RX ADMIN — Medication 0.5 MILLIGRAM(S): at 06:05

## 2019-01-10 RX ADMIN — SODIUM CHLORIDE 1000 MILLILITER(S): 9 INJECTION INTRAMUSCULAR; INTRAVENOUS; SUBCUTANEOUS at 17:00

## 2019-01-10 RX ADMIN — Medication 300 MILLIGRAM(S): at 21:59

## 2019-01-10 RX ADMIN — OXYCODONE HYDROCHLORIDE 60 MILLIGRAM(S): 5 TABLET ORAL at 22:30

## 2019-01-10 RX ADMIN — OXYCODONE HYDROCHLORIDE 60 MILLIGRAM(S): 5 TABLET ORAL at 21:55

## 2019-01-10 RX ADMIN — OXYCODONE HYDROCHLORIDE 30 MILLIGRAM(S): 5 TABLET ORAL at 19:36

## 2019-01-10 RX ADMIN — Medication 2 MILLIGRAM(S): at 17:43

## 2019-01-10 RX ADMIN — OXYCODONE HYDROCHLORIDE 30 MILLIGRAM(S): 5 TABLET ORAL at 20:30

## 2019-01-10 NOTE — PROGRESS NOTE ADULT - SUBJECTIVE AND OBJECTIVE BOX
Ortho Note    Pt continues to improve. No acute overnight issues. Appears comfortable without complaints, pain controlled  Denies CP, SOB, N/V, numbness/tingling     Vital Signs Last 24 Hrs  T(C): --  T(F): --  HR: --  BP: --  BP(mean): --  RR: --  SpO2: --  AVSS    Awake and oriented, NAD  Back DSG C/D/I, HV x2 in place and holding drain  Pulses: 2+ DP/PT bilaterally   Sensation: s/s/sp/dp/t intact bilaterally   Motor: EHL/FHL/TA/GS 5/5 bilaterally  Bilateral LE mild swelling, toes WWP                          9.4    7.3   )-----------( 132      ( 10 Kristian 2019 06:33 )             27.6   10 Kristian 2019 06:33    135    |  99     |  9      ----------------------------<  122    3.6     |  25     |  0.57     Phos  2.1       10 Kristian 2019 06:33  Mg     1.7       10 Kristian 2019 06:33    Drain output:    01-09-19 @ 07:01  -  01-10-19 @ 07:00  --------------------------------------------------------  OUT:    Accordian: 190 mL    Accordian: 140 mL  Total OUT: 330 mL    A/P: 53M POD#3 s/p ROSA, exploration of fusion, revision laminectomy, primary laminectomy L2-L4, re-instrumentation of posterior spinal fusion L1-sacrum   - Stable  - Pain Control  - DVT ppx: SCDs  - cont to monitor drains  - appreciate ID input, will cont vanc  - PT, WBS: WBAT  - dispo pending     Ortho Pager [phone]

## 2019-01-10 NOTE — DIETITIAN INITIAL EVALUATION ADULT. - ENERGY NEEDS
Ht (1/10 stated by pt): 71 in, Wt (1/10 stated by pt): 68.2kg,  IBW: 172# +/-10%, %IBW: 87% BMI: 20.9  ABW used to calculate energy needs due to pt's current body weight within % IBW. Needs adjusted for s/p surgery.

## 2019-01-10 NOTE — DIETITIAN INITIAL EVALUATION ADULT. - PROBLEM SELECTOR PLAN 1
Admit to Orthopaedic Service.  Presents today for elective exploration of fusion, Revision TLIF L1-Sacrum  Consult PM postop  Pt medically stable and cleared for procedure today by Dr. Sweet

## 2019-01-10 NOTE — PROGRESS NOTE ADULT - SUBJECTIVE AND OBJECTIVE BOX
ORTHO NOTE    [x ] Pt seen/examined.  [] Pt without any complaints/in NAD.    [x ] Pt complains of: back pain      ROS: [ ] Fever  [ ] Chills  [ ] CP [ ] SOB [ ] Dysnea  [ ] Palpitations [ ] Cough [ ] N/V/C/D [ ] Paresthia [ ] Other     [x ] ROS  otherwise negative    .    PHYSICAL EXAM:    Vital Signs Last 24 Hrs  T(C): 37.3 (10 Kristian 2019 14:25), Max: 37.5 (09 Jan 2019 17:00)  T(F): 99.1 (10 Kristian 2019 14:25), Max: 99.5 (09 Jan 2019 17:00)  HR: 85 (10 Kristian 2019 14:25) (85 - 100)  BP: 96/59 (10 Kristian 2019 14:25) (96/59 - 110/63)  BP(mean): --  RR: 16 (10 Kristian 2019 14:25) (16 - 17)  SpO2: 97% (10 Kristian 2019 14:25) (94% - 98%)    I&O's Detail    09 Jan 2019 07:01  -  10 Kristian 2019 07:00  --------------------------------------------------------  IN:    IV PiggyBack: 250 mL    lactated ringers.: 900 mL  Total IN: 1150 mL    OUT:    Accordian: 190 mL    Accordian: 140 mL    Indwelling Catheter - Urethral: 800 mL    Voided: 650 mL  Total OUT: 1780 mL    Total NET: -630 mL      10 Kristian 2019 07:01  -  10 Kristian 2019 16:38  --------------------------------------------------------  IN:    Oral Fluid: 660 mL  Total IN: 660 mL    OUT:    Accordian: 60 mL  Total OUT: 60 mL    Total NET: 600 mL           CAPILLARY BLOOD GLUCOSE                      Neuro: AAOX3, less somnolent and participates in care, no neuro focal deficits    Lungs: nonlabored, Spo2 wnl on RA, IS demonstrated    CV:    ABD: soft, nontender    Ext:  posterior betadine DSG C/D/I, HV x2 with serosanguinous drainage  Pulses: 2+ DP/PT bilaterally   Sensation: s/s/sp/dp/t intact bilaterally   Motor: EHL/FHL/TA/GS 5/5 bilaterally; spasticity noted on b/l LE with transfer to the chair  Bilateral LE mild swelling, toes WWP    LABS                        9.4    7.3   )-----------( 132      ( 10 Kristian 2019 06:33 )             27.6                                01-10    135  |  99  |  9   ----------------------------<  122<H>  3.6   |  25  |  0.57    Ca    8.3<L>      10 Kristian 2019 06:33  Phos  2.1     01-10  Mg     1.7     01-10        [ ] Other Labs  [ ] None ordered            Please check or [place] when present:  •  Heart Failure:    [ ] Acute        [ ]  Acute on Chronic        [ ] Chronic         [ ] Diastolic     [ ]  Combined    •  MARY:     [ ] ATN        [ ]  Renal medullary necrosis       [ ]  Renal cortical necrosis                  [ ] Other pathological Lesion:  •  CKD:  [ ] Stage I   [ ] Stage II  [ ] Stage III    [ ]Stage IV   [ ]  CKD V   [ ]  Other/Unspecified:    •  Abdominal Nutritional Status:   [ ] Malnutrition-See Nutrition note    [ ] Cachexia   [ ]  Other        [ ] Supplement ordered:            [ ] Morbid Obesity: BMI >=40         ASSESSMENT/PLAN:      STATUS POST:   pain control- pain mgnt transitioned to po regimen  head CT negative.  Mental status is improving    soft diet. advance to regular diet   continue home benzo xanax 2mg bid  MS  CONTINUE:          [ ] PT/OT- wbat    [ ] DVT PPX- scd    [ ] Pain Mgt- per pain mgnt    [ ] Dispo plan- per PT/OT recommendations ORTHO NOTE    [x ] Pt seen/examined.  [] Pt without any complaints/in NAD.    [x ] Pt complains of: back pain      ROS: [ ] Fever  [ ] Chills  [ ] CP [ ] SOB [ ] Dysnea  [ ] Palpitations [ ] Cough [ ] N/V/C/D [ ] Paresthia [ ] Other     [x ] ROS  otherwise negative    .    PHYSICAL EXAM:    Vital Signs Last 24 Hrs  T(C): 37.3 (10 Kristian 2019 14:25), Max: 37.5 (09 Jan 2019 17:00)  T(F): 99.1 (10 Kristian 2019 14:25), Max: 99.5 (09 Jan 2019 17:00)  HR: 85 (10 Kristian 2019 14:25) (85 - 100)  BP: 96/59 (10 Kristian 2019 14:25) (96/59 - 110/63)  BP(mean): --  RR: 16 (10 Kristian 2019 14:25) (16 - 17)  SpO2: 97% (10 Kristian 2019 14:25) (94% - 98%)    I&O's Detail    09 Jan 2019 07:01  -  10 Kristian 2019 07:00  --------------------------------------------------------  IN:    IV PiggyBack: 250 mL    lactated ringers.: 900 mL  Total IN: 1150 mL    OUT:    Accordian: 190 mL    Accordian: 140 mL    Indwelling Catheter - Urethral: 800 mL    Voided: 650 mL  Total OUT: 1780 mL    Total NET: -630 mL      10 Kristian 2019 07:01  -  10 Kristian 2019 16:38  --------------------------------------------------------  IN:    Oral Fluid: 660 mL  Total IN: 660 mL    OUT:    Accordian: 60 mL  Total OUT: 60 mL    Total NET: 600 mL           CAPILLARY BLOOD GLUCOSE                      Neuro: AAOX3, less somnolent and participates in care, no neuro focal deficits    Lungs: nonlabored, Spo2 wnl on RA, IS demonstrated    CV:    ABD: soft, nontender    Ext:  posterior betadine DSG C/D/I, HV x2 with serosanguinous drainage  Pulses: 2+ DP/PT bilaterally   Sensation: s/s/sp/dp/t intact bilaterally   Motor: EHL/FHL/TA/GS 5/5 bilaterally; spasticity noted on b/l LE with transfer to the chair  Bilateral LE mild swelling, toes WWP    LABS                        9.4    7.3   )-----------( 132      ( 10 Kristian 2019 06:33 )             27.6                                01-10    135  |  99  |  9   ----------------------------<  122<H>  3.6   |  25  |  0.57    Ca    8.3<L>      10 Kristian 2019 06:33  Phos  2.1     01-10  Mg     1.7     01-10        [ ] Other Labs  [ ] None ordered            Please check or [place] when present:  •  Heart Failure:    [ ] Acute        [ ]  Acute on Chronic        [ ] Chronic         [ ] Diastolic     [ ]  Combined    •  MARY:     [ ] ATN        [ ]  Renal medullary necrosis       [ ]  Renal cortical necrosis                  [ ] Other pathological Lesion:  •  CKD:  [ ] Stage I   [ ] Stage II  [ ] Stage III    [ ]Stage IV   [ ]  CKD V   [ ]  Other/Unspecified:    •  Abdominal Nutritional Status:   [ ] Malnutrition-See Nutrition note    [ ] Cachexia   [ ]  Other        [ ] Supplement ordered:            [ ] Morbid Obesity: BMI >=40         ASSESSMENT/PLAN:      STATUS POST:   pain control- pain mgnt transitioned to po regimen  head CT negative.  Mental status is improving    soft diet. advance to regular diet   continue home benzo xanax 2mg bid  MS  no active infection. Dr. Carlson is aware ID recommends to dc antibiotics.  Dr. Carlson recommends to continue IV vancomycin for duration of hospitalization to prevent infection, as this is a revision procedure  CONTINUE:          [ ] PT/OT- wbat    [ ] DVT PPX- scd    [ ] Pain Mgt- per pain mgnt    [ ] Dispo plan- per PT/OT recommendations ORTHO NOTE    [x ] Pt seen/examined.  [] Pt without any complaints/in NAD.    [x ] Pt complains of: back pain      ROS: [ ] Fever  [ ] Chills  [ ] CP [ ] SOB [ ] Dysnea  [ ] Palpitations [ ] Cough [ ] N/V/C/D [ ] Paresthia [ ] Other     [x ] ROS  otherwise negative    .    PHYSICAL EXAM:    Vital Signs Last 24 Hrs  T(C): 37.3 (10 Kristian 2019 14:25), Max: 37.5 (09 Jan 2019 17:00)  T(F): 99.1 (10 Kristian 2019 14:25), Max: 99.5 (09 Jan 2019 17:00)  HR: 85 (10 Kristian 2019 14:25) (85 - 100)  BP: 96/59 (10 Kristian 2019 14:25) (96/59 - 110/63)  BP(mean): --  RR: 16 (10 Kristian 2019 14:25) (16 - 17)  SpO2: 97% (10 Kristian 2019 14:25) (94% - 98%)    I&O's Detail    09 Jan 2019 07:01  -  10 Kristian 2019 07:00  --------------------------------------------------------  IN:    IV PiggyBack: 250 mL    lactated ringers.: 900 mL  Total IN: 1150 mL    OUT:    Accordian: 190 mL    Accordian: 140 mL    Indwelling Catheter - Urethral: 800 mL    Voided: 650 mL  Total OUT: 1780 mL    Total NET: -630 mL      10 Kristian 2019 07:01  -  10 Kristian 2019 16:38  --------------------------------------------------------  IN:    Oral Fluid: 660 mL  Total IN: 660 mL    OUT:    Accordian: 60 mL  Total OUT: 60 mL    Total NET: 600 mL           CAPILLARY BLOOD GLUCOSE                      Neuro: AAOX3, less somnolent and participates in care, no neuro focal deficits    Lungs: nonlabored, Spo2 wnl on RA, IS demonstrated    CV:    ABD: soft, nontender    Ext:  posterior betadine DSG C/D/I, HV x2 with serosanguinous drainage  Pulses: 2+ DP/PT bilaterally   Sensation: s/s/sp/dp/t intact bilaterally   Motor: EHL/FHL/TA/GS 5/5 bilaterally; spasticity noted on b/l LE with transfer to the chair  Bilateral LE mild swelling, toes WWP    LABS                        9.4    7.3   )-----------( 132      ( 10 Kristian 2019 06:33 )             27.6                                01-10    135  |  99  |  9   ----------------------------<  122<H>  3.6   |  25  |  0.57    Ca    8.3<L>      10 Kristian 2019 06:33  Phos  2.1     01-10  Mg     1.7     01-10        [ ] Other Labs  [ ] None ordered            Please check or [place] when present:  •  Heart Failure:    [ ] Acute        [ ]  Acute on Chronic        [ ] Chronic         [ ] Diastolic     [ ]  Combined    •  MARY:     [ ] ATN        [ ]  Renal medullary necrosis       [ ]  Renal cortical necrosis                  [ ] Other pathological Lesion:  •  CKD:  [ ] Stage I   [ ] Stage II  [ ] Stage III    [ ]Stage IV   [ ]  CKD V   [ ]  Other/Unspecified:    •  Abdominal Nutritional Status:   [ ] Malnutrition-See Nutrition note    [ ] Cachexia   [ ]  Other        [ ] Supplement ordered:            [ ] Morbid Obesity: BMI >=40         ASSESSMENT/PLAN:      STATUS POST: POD#3 s/p ROSA, exploration of fusion, revision laminectomy, primary laminectomy L2-L4, re-instrumentation of posterior spinal fusion L1-sacrum   assisted oob to chair  pain control- pain mgnt transitioned to po regimen  head CT negative.  Mental status is improving    soft diet. aspiration precautions. advance to regular diet if patient can find dentures   continue home benzo xanax 2mg bid  MS  no active infection. Dr. Carlson is aware ID recommends to dc antibiotics.  Dr. Carlson recommends to continue IV vancomycin for duration of hospitalization to prevent infection, as this is a revision procedure  CONTINUE:          [ ] PT/OT- wbat    [ ] DVT PPX- scd    [ ] Pain Mgt- per pain mgnt    [ ] Dispo plan- per PT/OT recommendations

## 2019-01-10 NOTE — PROGRESS NOTE ADULT - SUBJECTIVE AND OBJECTIVE BOX
Pain Management Progress Note - [facility] [phone]    HPI: Patient seen during morning rounds, in NAD. Continue to c/o pain. States PCA help to control pain.        Pertinent PMH: Pain at: _x__Back ___Neck___Knee ___Hip ___Shoulder ___ Opioid tolerance    Pain is _x__ sharp ____dull ___burning __x_achy ___ Intensity: ____ mild __x__mod ____severe     Location ___x__surgical site _____cervical __x___lumbar ____abd _____upper ext____lower ext    Worse with __x__activity __x__movement _____physical therapy___ Rest    Improved with __x__medication __x__rest ____physical therapy      BUpivacaine liposome 1.3% Injectable (no eMAR)  oxyCODONE  ER Tablet  oxyCODONE    IR  HYDROmorphone  Injectable  acetaminophen   Tablet ..  HYDROmorphone  Injectable  lactated ringers.  vancomycin  IVPB  HYDROmorphone  Injectable  HYDROmorphone PCA (1 mG/mL)  HYDROmorphone PCA (1 mG/mL) [facility]  naloxone Injectable  ondansetron Injectable  HYDROmorphone  Injectable  ALPRAZolam  HYDROmorphone PCA (1 mG/mL) Rescue [facility]us  naloxone Injectable  sodium chloride 0.9% Bolus  norepinephrine Infusion  vancomycin  IVPB  cefepime   IVPB  magnesium sulfate  IVPB  acetaminophen  IVPB ..  LORazepam   Injectable  HYDROmorphone PCA (1 mG/mL)  naloxone Injectable  ondansetron Injectable  vancomycin  IVPB  ([place])  (Floor[place])  vancomycin  IVPB  polyethylene glycol 3350  docusate sodium  senna  bisacodyl  vancomycin  IVPB  oxyCODONE    IR  oxyCODONE  ER Tablet  HYDROmorphone  Injectable      ROS: Const:  __-_febrile   Eyes:___ENT:___CV: _-__chest pain  Resp: _-___sob  GI:___nausea ___vomiting ____abd pain ___npo ___clears ___full diet __bm  :___ [place]: __x_pain ___spasm  Skin:___ Neuro:  ___sedation___confusion____ numbness ___weakness ___paresthesia  Psych:___anxiety  Endo:___ Heme:___Allergy:___  __x__ all other systems reviewed and negative     01-10 @ 06:34026 mL/min/1.73M2      Hemoglobin: 9.4 g/dL (01-10 @ 06:33)  Hemoglobin: 10.6 g/dL (01-09 @ 05:40)      T(C): 37.3 (01-10-19 @ 14:25), Max: 37.5 (01-09-19 @ 17:00)  HR: 85 (01-10-19 @ 14:25) (85 - 100)  BP: 96/59 (01-10-19 @ 14:25) (96/59 - 110/63)  RR: 16 (01-10-19 @ 14:25) (16 - 17)  SpO2: 97% (01-10-19 @ 14:25) (94% - 98%)  Wt(kg): --     PHYSICAL EXAM:  Gen Appearance: _x__no acute distress __x_appropriate    Neuro: __x_SILT feet____ EOM Intact Psych: AAOX_3_, __x_mood/affect appropriate        Eyes: _x__conjunctiva WNL  _____ Pupils equal and round        ENT: __x_ears and nose atraumatic__x_ Hearing grossly intact        Neck: _x__trachea midline, no visible masses ___thyroid without palpable mass    Resp: __x_Nml WOB____No tactile fremitus ___clear to auscultation    Cardio: _x__extremities free from edema _x___pedal pulses palpable    GI/Abdomen: ___soft __x___ Nontender___x___Nondistended_____HSM    Lymphatic: ___no palpable nodes in neck  ___no palpable nodes calves and feet    Skin/Wound: ___Incision, _x__Dressing c/d/i,   ____surrounding tissues soft,  ___drain/chest tube present____    Muscular: EHL __5_/5  Gastrocnemius_5__/5    _x__absent clubbing/cyanosis         ASSESSMENT:  This is a 54y old Male with a history of:  M51.36 M43.00 M48.06 M54.5  Handoff  MEWS Score  Depression  COPD (chronic obstructive pulmonary disease)  MS (multiple sclerosis)  Hepatitis C  Spinal stenosis of lumbar region, unspecified whether neurogenic claudication present  Spinal fusion failure, sequela  Other kyphosis of thoracolumbar region  Spinal fusion failure, sequela  Spinal stenosis of lumbar region, unspecified whether neurogenic claudication present  Other kyphosis of thoracolumbar region  Laminectomy of lumbar spine 3 or more levels with exploration or decompression or both  Spinal stenosis of lumbosacral region  MS (multiple sclerosis)  Depression  Hepatitis C  Spinal stenosis of lumbosacral region  Laminectomy of lumbar spine 3 or more levels with exploration or decompression or both  Surgery, elective        Recommended Treatment PLAN:    1. d/c PCA  2. Resume home does of medication prescribed by Dr. Abreu:  Oxycodone 30mg PO q4 PRN pain   Oxycontin 60mg PO TID  3. Dilaudid 1mg IV q2 PRN breakthrough pain  Plan discussed with Dr. Castillo

## 2019-01-10 NOTE — DIETITIAN INITIAL EVALUATION ADULT. - OTHER INFO
53y old Male with chronic pain history, Hep C, and h/o spinal stenosis and spinal fusion, s/p laminectomy fusion. now POD#3 s/p ROSA, exploration of fusion, revision laminectomy, primary laminectomy L2-L4, re-instrumentation of posterior spinal fusion L1-sacrum. Course noteable for need for SICU care 2/2lethargy, hypotension, fever. Pt now stepped down to tele. Team concerned for aspiration, pt cleared for regular diet per MD per RN. Pt passed bedside swallow eval by RN, monitor need for SLP eval. Pt states that he hasn't eaten in 4 days and has a ravaging appetite, asking for food- d/w RN who will provide pt with tray. Pt denying N/V. Pt reports some pain from surgery. No BM since admission, pt unaware of last BM, on bowel regimen. Discussed advancement to regular diet with pt and increased needs s/p surgery.

## 2019-01-10 NOTE — PROGRESS NOTE ADULT - SUBJECTIVE AND OBJECTIVE BOX
Patient is a 54y old  Male who presents with a chief complaint of low back pain (10 Kristian 2019 12:46)      INTERVAL HPI/OVERNIGHT EVENTS:          ROS  (- ) headache  ( -  )fevers/chills  ( - ) dyspnea  (  - ) cough  (  - ) chest pain  (  - ) palpatations  ( - ) dizziness/lightheadedness  (  - ) nausea/vomiting  (  - ) abd pain  (  - ) diarrhea  (  - ) melena  (  - ) hematochezia  (  - ) dysuria   ( - ) hematuria  (  - ) leg swelling    ( - ) calf tenderness  (  - ) motor weakness  ( - ) extremity numbness  ( - ) back pain  ( + ) tolerating POs  ( + ) BM  ROS: 12 point review of systems otherwise negative              MEDICATIONS  (STANDING):  docusate sodium 100 milliGRAM(s) Oral three times a day  HYDROmorphone PCA (1 mG/mL) 30 milliLiter(s) PCA Continuous PCA Continuous  LORazepam   Injectable 0.5 milliGRAM(s) IV Push every 12 hours  polyethylene glycol 3350 17 Gram(s) Oral daily  senna 2 Tablet(s) Oral at bedtime  vancomycin  IVPB 1250 milliGRAM(s) IV Intermittent every 12 hours    MEDICATIONS  (PRN):  bisacodyl 5 milliGRAM(s) Oral every 12 hours PRN Constipation  naloxone Injectable 0.1 milliGRAM(s) IV Push every 3 minutes PRN For ANY of the following changes in patient status:  A. RR LESS THAN 10 breaths per minute, B. Oxygen saturation LESS THAN 90%, C. Sedation score of 6  ondansetron Injectable 4 milliGRAM(s) IV Push every 6 hours PRN Nausea      Allergies    No Known Allergies    Intolerances    erythromycin (Stomach Upset)        Vital Signs Last 24 Hrs  T(C): 37.4 (10 Kristian 2019 05:14), Max: 37.5 (2019 17:00)  T(F): 99.4 (10 Kristian 2019 05:14), Max: 99.5 (2019 17:00)  HR: 90 (10 Kristian 2019 05:14) (90 - 100)  BP: 110/63 (10 Kristian 2019 05:14) (99/51 - 110/63)  BP(mean): --  RR: 16 (10 Kristian 2019 05:14) (16 - 17)  SpO2: 98% (10 Kristian 2019 05:14) (94% - 98%)  CAPILLARY BLOOD GLUCOSE           @ 07:01  -  10 @ 07:00  --------------------------------------------------------  IN: 1150 mL / OUT: 1780 mL / NET: -630 mL        Physical Exam:    Daily     Daily Weight in k.2 (10 Kristian 2019 11:50)    LABS:                        9.4    7.3   )-----------( 132      ( 10 Kristian 2019 06:33 )             27.6     01-10    135  |  99  |  9   ----------------------------<  122<H>  3.6   |  25  |  0.57    Ca    8.3<L>      10 Kristian 2019 06:33  Phos  2.1     01-10  Mg     1.7     01-10              RADIOLOGY & ADDITIONAL TESTS: Patient is a 54y old  Male who presents with a chief complaint of low back pain (10 Kristian 2019 12:46)      INTERVAL HPI/OVERNIGHT EVENTS:    seen in pacu  has some left hip pain at incision site         ROS  (- ) headache  ( -  )fevers/chills  ( - ) dyspnea  (  - ) cough  (  - ) chest pain  (  - ) palpatations  ( - ) dizziness/lightheadedness  (  - ) nausea/vomiting  (  - ) abd pain  (  - ) diarrhea  (  - ) melena  (  - ) hematochezia  (  - ) dysuria   ( - ) hematuria     ( - ) extremity numbness  ( - ) back pain  ( + ) tolerating POs  ( + ) BM  ROS: 12 point review of systems otherwise negative              MEDICATIONS  (STANDING):  docusate sodium 100 milliGRAM(s) Oral three times a day  HYDROmorphone PCA (1 mG/mL) 30 milliLiter(s) PCA Continuous PCA Continuous  LORazepam   Injectable 0.5 milliGRAM(s) IV Push every 12 hours  polyethylene glycol 3350 17 Gram(s) Oral daily  senna 2 Tablet(s) Oral at bedtime  vancomycin  IVPB 1250 milliGRAM(s) IV Intermittent every 12 hours    MEDICATIONS  (PRN):  bisacodyl 5 milliGRAM(s) Oral every 12 hours PRN Constipation  naloxone Injectable 0.1 milliGRAM(s) IV Push every 3 minutes PRN For ANY of the following changes in patient status:  A. RR LESS THAN 10 breaths per minute, B. Oxygen saturation LESS THAN 90%, C. Sedation score of 6  ondansetron Injectable 4 milliGRAM(s) IV Push every 6 hours PRN Nausea      Allergies    No Known Allergies    Intolerances    erythromycin (Stomach Upset)        Vital Signs Last 24 Hrs  T(C): 37.4 (10 Kristian 2019 05:14), Max: 37.5 (2019 17:00)  T(F): 99.4 (10 Kristian 2019 05:14), Max: 99.5 (2019 17:00)  HR: 90 (10 Kristian 2019 05:14) (90 - 100)  BP: 110/63 (10 Kristian 2019 05:14) (99/51 - 110/63)  BP(mean): --  RR: 16 (10 Kristian 2019 05:14) (16 - 17)  SpO2: 98% (10 Kristian 2019 05:14) (94% - 98%)  CAPILLARY BLOOD GLUCOSE           @ 07:01  -  01-10 @ 07:00  --------------------------------------------------------  IN: 1150 mL / OUT: 1780 mL / NET: -630 mL        Physical Exam:    Daily     Daily Weight in k.2 (10 Kristian 2019 11:50)  nad  perrl, poor dentition  s1 s2 nml , rrr  cta bilat  (+) bs, soft, nt, nd  low back dressings clean/intact  cn ii -xii intact, 5/5 str upper ext,  4/5 str lower ext bilat, senstation intact throughout.     LABS:                        9.4    7.3   )-----------( 132      ( 10 Kristian 2019 06:33 )             27.6     01-10    135  |  99  |  9   ----------------------------<  122<H>  3.6   |  25  |  0.57    Ca    8.3<L>      10 Kristian 2019 06:33  Phos  2.1     01-10  Mg     1.7     01-10              RADIOLOGY & ADDITIONAL TESTS: Patient is a 54y old  Male who presents with a chief complaint of low back pain (10 Kristian 2019 12:46)      INTERVAL HPI/OVERNIGHT EVENTS:    hungry  more awake today  has mod back pain at incision site      ROS  (- ) headache  ( -  )fevers/chills  ( - ) dyspnea  (  - ) cough  (  - ) chest pain  (  - ) palpatations  ( - ) dizziness/lightheadedness  (  - ) nausea/vomiting  (  - ) abd pain  (  - ) diarrhea  (  - ) melena  (  - ) hematochezia  (  - ) dysuria   ( - ) hematuria       ROS: 12 point review of systems otherwise negative              MEDICATIONS  (STANDING):  docusate sodium 100 milliGRAM(s) Oral three times a day  HYDROmorphone PCA (1 mG/mL) 30 milliLiter(s) PCA Continuous PCA Continuous  LORazepam   Injectable 0.5 milliGRAM(s) IV Push every 12 hours  polyethylene glycol 3350 17 Gram(s) Oral daily  senna 2 Tablet(s) Oral at bedtime  vancomycin  IVPB 1250 milliGRAM(s) IV Intermittent every 12 hours    MEDICATIONS  (PRN):  bisacodyl 5 milliGRAM(s) Oral every 12 hours PRN Constipation  naloxone Injectable 0.1 milliGRAM(s) IV Push every 3 minutes PRN For ANY of the following changes in patient status:  A. RR LESS THAN 10 breaths per minute, B. Oxygen saturation LESS THAN 90%, C. Sedation score of 6  ondansetron Injectable 4 milliGRAM(s) IV Push every 6 hours PRN Nausea      Allergies    No Known Allergies    Intolerances    erythromycin (Stomach Upset)        Vital Signs Last 24 Hrs  T(C): 37.4 (10 Kristian 2019 05:14), Max: 37.5 (2019 17:00)  T(F): 99.4 (10 Kristian 2019 05:14), Max: 99.5 (2019 17:00)  HR: 90 (10 Kristian 2019 05:14) (90 - 100)  BP: 110/63 (10 Kristian 2019 05:14) (99/51 - 110/63)  BP(mean): --  RR: 16 (10 Kristian 2019 05:14) (16 - 17)  SpO2: 98% (10 Kristian 2019 05:14) (94% - 98%)  CAPILLARY BLOOD GLUCOSE           @ 07:01  -  01-10 @ 07:00  --------------------------------------------------------  IN: 1150 mL / OUT: 1780 mL / NET: -630 mL        Physical Exam:    Daily     Daily Weight in k.2 (10 Kristian 2019 11:50)  nad  perrl, poor dentition  s1 s2 nml , rrr  cta bilat  (+) bs, soft, nt, nd  low back dressings clean/intact  cn ii -xii intact, 5/5 str upper ext,  4/5 str lower ext bilat, senstation intact throughout.     LABS:                        9.4    7.3   )-----------( 132      ( 10 Kristian 2019 06:33 )             27.6     01-10    135  |  99  |  9   ----------------------------<  122<H>  3.6   |  25  |  0.57    Ca    8.3<L>      10 Kristian 2019 06:33  Phos  2.1     -10  Mg     1.7     -10              RADIOLOGY & ADDITIONAL TESTS:

## 2019-01-11 LAB
ANION GAP SERPL CALC-SCNC: 11 MMOL/L — SIGNIFICANT CHANGE UP (ref 5–17)
BUN SERPL-MCNC: 9 MG/DL — SIGNIFICANT CHANGE UP (ref 7–23)
CALCIUM SERPL-MCNC: 7.9 MG/DL — LOW (ref 8.4–10.5)
CHLORIDE SERPL-SCNC: 99 MMOL/L — SIGNIFICANT CHANGE UP (ref 96–108)
CO2 SERPL-SCNC: 28 MMOL/L — SIGNIFICANT CHANGE UP (ref 22–31)
CREAT SERPL-MCNC: 0.65 MG/DL — SIGNIFICANT CHANGE UP (ref 0.5–1.3)
GLUCOSE SERPL-MCNC: 136 MG/DL — HIGH (ref 70–99)
HCT VFR BLD CALC: 25 % — LOW (ref 39–50)
HGB BLD-MCNC: 8.6 G/DL — LOW (ref 13–17)
MAGNESIUM SERPL-MCNC: 1.9 MG/DL — SIGNIFICANT CHANGE UP (ref 1.6–2.6)
MCHC RBC-ENTMCNC: 31.6 PG — SIGNIFICANT CHANGE UP (ref 27–34)
MCHC RBC-ENTMCNC: 34.4 G/DL — SIGNIFICANT CHANGE UP (ref 32–36)
MCV RBC AUTO: 91.9 FL — SIGNIFICANT CHANGE UP (ref 80–100)
PHOSPHATE SERPL-MCNC: 2.3 MG/DL — LOW (ref 2.5–4.5)
PLATELET # BLD AUTO: 175 K/UL — SIGNIFICANT CHANGE UP (ref 150–400)
POTASSIUM SERPL-MCNC: 3 MMOL/L — LOW (ref 3.5–5.3)
POTASSIUM SERPL-SCNC: 3 MMOL/L — LOW (ref 3.5–5.3)
RBC # BLD: 2.72 M/UL — LOW (ref 4.2–5.8)
RBC # FLD: 11.8 % — SIGNIFICANT CHANGE UP (ref 10.3–16.9)
SODIUM SERPL-SCNC: 138 MMOL/L — SIGNIFICANT CHANGE UP (ref 135–145)
WBC # BLD: 5.4 K/UL — SIGNIFICANT CHANGE UP (ref 3.8–10.5)
WBC # FLD AUTO: 5.4 K/UL — SIGNIFICANT CHANGE UP (ref 3.8–10.5)

## 2019-01-11 PROCEDURE — 99233 SBSQ HOSP IP/OBS HIGH 50: CPT

## 2019-01-11 RX ORDER — NICOTINE POLACRILEX 2 MG
1 GUM BUCCAL DAILY
Qty: 0 | Refills: 0 | Status: DISCONTINUED | OUTPATIENT
Start: 2019-01-11 | End: 2019-01-15

## 2019-01-11 RX ORDER — POTASSIUM CHLORIDE 20 MEQ
40 PACKET (EA) ORAL ONCE
Qty: 0 | Refills: 0 | Status: COMPLETED | OUTPATIENT
Start: 2019-01-11 | End: 2019-01-11

## 2019-01-11 RX ADMIN — OXYCODONE HYDROCHLORIDE 30 MILLIGRAM(S): 5 TABLET ORAL at 19:00

## 2019-01-11 RX ADMIN — OXYCODONE HYDROCHLORIDE 60 MILLIGRAM(S): 5 TABLET ORAL at 05:20

## 2019-01-11 RX ADMIN — OXYCODONE HYDROCHLORIDE 60 MILLIGRAM(S): 5 TABLET ORAL at 13:50

## 2019-01-11 RX ADMIN — OXYCODONE HYDROCHLORIDE 60 MILLIGRAM(S): 5 TABLET ORAL at 06:00

## 2019-01-11 RX ADMIN — SENNA PLUS 2 TABLET(S): 8.6 TABLET ORAL at 21:42

## 2019-01-11 RX ADMIN — Medication 300 MILLIGRAM(S): at 21:42

## 2019-01-11 RX ADMIN — OXYCODONE HYDROCHLORIDE 60 MILLIGRAM(S): 5 TABLET ORAL at 21:42

## 2019-01-11 RX ADMIN — Medication 40 MILLIEQUIVALENT(S): at 13:49

## 2019-01-11 RX ADMIN — Medication 1 PATCH: at 20:19

## 2019-01-11 RX ADMIN — OXYCODONE HYDROCHLORIDE 30 MILLIGRAM(S): 5 TABLET ORAL at 18:01

## 2019-01-11 RX ADMIN — OXYCODONE HYDROCHLORIDE 30 MILLIGRAM(S): 5 TABLET ORAL at 09:19

## 2019-01-11 RX ADMIN — Medication 100 MILLIGRAM(S): at 13:49

## 2019-01-11 RX ADMIN — OXYCODONE HYDROCHLORIDE 30 MILLIGRAM(S): 5 TABLET ORAL at 04:50

## 2019-01-11 RX ADMIN — OXYCODONE HYDROCHLORIDE 30 MILLIGRAM(S): 5 TABLET ORAL at 05:18

## 2019-01-11 RX ADMIN — Medication 2 MILLIGRAM(S): at 23:01

## 2019-01-11 RX ADMIN — OXYCODONE HYDROCHLORIDE 60 MILLIGRAM(S): 5 TABLET ORAL at 14:20

## 2019-01-11 RX ADMIN — Medication 300 MILLIGRAM(S): at 09:18

## 2019-01-11 RX ADMIN — OXYCODONE HYDROCHLORIDE 30 MILLIGRAM(S): 5 TABLET ORAL at 23:38

## 2019-01-11 RX ADMIN — OXYCODONE HYDROCHLORIDE 30 MILLIGRAM(S): 5 TABLET ORAL at 10:15

## 2019-01-11 RX ADMIN — OXYCODONE HYDROCHLORIDE 60 MILLIGRAM(S): 5 TABLET ORAL at 22:40

## 2019-01-11 RX ADMIN — Medication 2 MILLIGRAM(S): at 05:20

## 2019-01-11 RX ADMIN — Medication 1 PATCH: at 13:50

## 2019-01-11 NOTE — PHYSICAL THERAPY INITIAL EVALUATION ADULT - DID THE PATIENT HAVE SURGERY?
yes/Laminectomy of lumbar spine 3 or more levels with exploration or decompression or both  01/07/2019  Removal of hardware, exploration of fusion, revision laminectomy, primary laminectomy L2-L4, reinstrumentation of posterior spinal fusion L1-sacrum

## 2019-01-11 NOTE — PROGRESS NOTE ADULT - SUBJECTIVE AND OBJECTIVE BOX
CC: Feeling well. Some pain.   No other complaints.   Denies cp, sob, n/v/d/c.   Rest of ROS negative.     Vital Signs Last 24 Hrs  T(C): 36.4 (11 Jan 2019 16:52), Max: 37.2 (11 Jan 2019 08:45)  T(F): 97.6 (11 Jan 2019 16:52), Max: 99 (11 Jan 2019 08:45)  HR: 103 (11 Jan 2019 16:52) (80 - 103)  BP: 95/56 (11 Jan 2019 16:52) (85/50 - 122/61)  BP(mean): --  RR: 16 (11 Jan 2019 16:52) (15 - 17)  SpO2: 98% (11 Jan 2019 16:52) (96% - 100%)    PHYSICAL EXAMINATION  * General: Seemed somnolent but once aroused, able to maintain a full conversation. Not in acute distress. Awake and alert. Lying comfortably in bed.  * Lungs: Clear to auscultation, no rales, no wheezes.  * Cardio: Regular rate and rhythm, no murmurs, no rubs, no gallops. Good peripheral pulses.  * Abdomen: Soft, non-tender, non-distended, tympanic to percussion, no rebound, no guarding, no rigidity. Bowel sounds present. No suprapubic or CVA tenderness.  * Neuro: Speaking in full sentences. Alert and oriented x 3. No focal deficits. Motor strength is 5/5 throughout. Sensation intact. Cranial nerves II-XII grossly intact.                           8.6    5.4   )-----------( 175      ( 11 Jan 2019 06:59 )             25.0   01-11    138  |  99  |  9   ----------------------------<  136<H>  3.0<L>   |  28  |  0.65    Ca    7.9<L>      11 Jan 2019 06:59  Phos  2.3     01-11  Mg     1.9     01-11    MEDICATIONS  (STANDING):  ALPRAZolam 2 milliGRAM(s) Oral two times a day  docusate sodium 100 milliGRAM(s) Oral three times a day  nicotine -   7 mG/24Hr(s) Patch 1 patch Transdermal daily  oxyCODONE  ER Tablet 60 milliGRAM(s) Oral every 8 hours  polyethylene glycol 3350 17 Gram(s) Oral daily  senna 2 Tablet(s) Oral at bedtime  vancomycin  IVPB 1500 milliGRAM(s) IV Intermittent every 12 hours    MEDICATIONS  (PRN):  bisacodyl 5 milliGRAM(s) Oral every 12 hours PRN Constipation  HYDROmorphone  Injectable 1 milliGRAM(s) IV Push every 2 hours PRN breakthrough pain  oxyCODONE    IR 30 milliGRAM(s) Oral every 4 hours PRN Severe Pain (7 - 10)

## 2019-01-11 NOTE — PHYSICAL THERAPY INITIAL EVALUATION ADULT - CRITERIA FOR SKILLED THERAPEUTIC INTERVENTIONS
rehab potential/predicted duration of therapy intervention/therapy frequency/impairments found/functional limitations in following categories/anticipated discharge recommendation/risk reduction/prevention

## 2019-01-11 NOTE — OCCUPATIONAL THERAPY INITIAL EVALUATION ADULT - PERTINENT HX OF CURRENT PROBLEM, REHAB EVAL
53M s/p Laminectomy fusion c/o low back pain x > 1 year.  Patient with PMH of MS. Pt states back pain occasionally radiates down LE b/l but denies numbness or tingling down both lower extremities. S/P Laminectomy of lumbar spine 3 or more levels with exploration or decompression or both  01/07/2019  Removal of hardware, exploration of fusion, revision laminectomy, primary laminectomy L2-L4, reinstrumentation of posterior spinal fusion L1-sacrum.

## 2019-01-11 NOTE — PHYSICAL THERAPY INITIAL EVALUATION ADULT - GENERAL OBSERVATIONS, REHAB EVAL
Patient received semi-supine in no acute distress, c/o constant 7/10 pain, +Telemetry, +SCDs, +IV, +Hemovac x 1. Cleared by JULIETA Starr. Agreeable to PT.

## 2019-01-11 NOTE — PROGRESS NOTE ADULT - SUBJECTIVE AND OBJECTIVE BOX
Orthopaedic Surgery Progress Note    Patient seen and examined. HIRA. Patient without complaints. Pain controlled. Denies CP, SOB, N/V, tactile fevers, calf pain. Cardiology and ID consults have ruled out endocarditis. HV #2 discontinued.       Vital Signs Last 24 Hrs  T(C): 37.2 (11 Jan 2019 08:45), Max: 37.3 (10 Kristian 2019 14:25)  T(F): 99 (11 Jan 2019 08:45), Max: 99.1 (10 Kristian 2019 14:25)  HR: 87 (11 Jan 2019 08:45) (80 - 93)  BP: 102/66 (11 Jan 2019 08:45) (94/63 - 102/66)  BP(mean): --  RR: 17 (11 Jan 2019 08:45) (15 - 17)  SpO2: 100% (11 Jan 2019 08:45) (97% - 100%)        Physical Exam:  Pt laying comfortably in bed, NAD.  Skin warm and well perfused, no visible erythema/ecchymoses.  Dressing C/D/I; HV x 1   EHL/FHL/TA/GS 5/5 motor strength bilaterally  SLT in tact and equal to distal bilateral lower extremities  Calves nontender to palpation  DP/PT pulses 2+    LABS                        8.6    5.4   )-----------( 175      ( 11 Jan 2019 06:59 )             25.0                                01-11    138  |  99  |  9   ----------------------------<  136<H>  3.0<L>   |  28  |  0.65    Ca    7.9<L>      11 Jan 2019 06:59  Phos  2.3     01-11  Mg     1.9     01-11        A/P: 53M POD #4 s/p ROSA, exploration of fusion, revision lami, primary lami L2-L4, reinstrumentation of fusion L1-Sacrum    CONTINUE:        1. PT: WBAT  2. DVT prophylaxis: SCDs  3. Pain Control as needed   4. Dispo: home v rehab  5. Follow up drain output

## 2019-01-11 NOTE — PROGRESS NOTE ADULT - SUBJECTIVE AND OBJECTIVE BOX
POST OPERATIVE DAY #:  4  STATUS POST: revision lumbar fusion            SUBJECTIVE: Patient seen and examined sitting in bed comfortable. Incisional back pain controlled, leg pain improved    OBJECTIVE:     T(C): 36.4 (01-11-19 @ 05:20), Max: 37.3 (01-10-19 @ 14:25)  HR: 81 (01-11-19 @ 05:20) (80 - 93)  BP: 94/63 (01-11-19 @ 05:20) (94/63 - 102/59)  RR: 16 (01-11-19 @ 05:20) (15 - 16)  SpO2: 99% (01-11-19 @ 05:20) (97% - 99%)    Affected extremity:          Dressing: [x ] clean/dry/intact         Drains: x2 drains-130/0         Sensation: [x ] intact to light touch  [ ] decreased:          Motor exam: [  ]            [x ] Lower extremeity          HF(l2)   KE(l3)    TA(l4)   EHL(l5)  GS(s1)                                                 R        5/5        5/5        5/5       5/5         5/5                                               L         5/5        5/5       5/5       5/5          5/5                                                        [x ] warm well perfused; capillary refill <3 seconds; no calf swelling, no tenderness              I&O's Detail    10 Kristian 2019 07:01  -  11 Jan 2019 07:00  --------------------------------------------------------  IN:    Oral Fluid: 660 mL  Total IN: 660 mL    OUT:    Accordian: 130 mL    Voided: 900 mL  Total OUT: 1030 mL    Total NET: -370 mL            A/P :  54y Male s/p revision lumbar fusion  -    Pain control  -    DVT ppx: SCDs    -    Periop abx:  Vanco    -    ADAT  -    Check AM labs  -    Weight bearing status: WBAT          -    Physical Therapy  -    Dispo:     Rehab when cleared by medicine

## 2019-01-11 NOTE — PHYSICAL THERAPY INITIAL EVALUATION ADULT - ADDITIONAL COMMENTS
Patient lives with elderly mother in apartment building with 2 steps to enter. Dependent on mother for all ADLs who works full time. Patient baseline to ambulate from bed to PC desk with SC. Doesn't ambulate outdoors due to lack of assistance. Reports at least falls past year due to buckling, 'I did not have control of my legs, they would just buckle'

## 2019-01-11 NOTE — OCCUPATIONAL THERAPY INITIAL EVALUATION ADULT - GENERAL OBSERVATIONS, REHAB EVAL
Patient cleared for OT evaluation by JULIETA Starr (covering). Patient received semi-vargas, NAD, +hemovac, +tele, +IV heplock.
steady

## 2019-01-11 NOTE — PROGRESS NOTE ADULT - SUBJECTIVE AND OBJECTIVE BOX
Pain Management Progress Note - [facility] [phone]    HPI: Patient seen during morning rounds, in NAD. States pain is manageable on current regimen.       Pertinent PMH: Pain at: __x_Back ___Neck___Knee ___Hip ___Shoulder ___ Opioid tolerance    Pain is _x__ sharp ____dull ___burning _x__achy ___ Intensity: ____ mild ___x_mod ____severe     Location ____x_surgical site _____cervical __x___lumbar ____abd _____upper ext____lower ext    Worse with __x__activity _x___movement _____physical therapy___ Rest    Improved with __x__medication __x__rest ____physical therapy      BUpivacaine liposome 1.3% Injectable (no eMAR)  oxyCODONE  ER Tablet  oxyCODONE    IR  HYDROmorphone  Injectable  acetaminophen   Tablet ..  HYDROmorphone  Injectable  lactated ringers.  vancomycin  IVPB  HYDROmorphone  Injectable  HYDROmorphone PCA (1 mG/mL)  HYDROmorphone PCA (1 mG/mL) Rescue Clinician Bolus  naloxone Injectable  ondansetron Injectable  HYDROmorphone  Injectable  ALPRAZolam  HYDROmorphone PCA (1 mG/mL) Rescue Clinician Bolus  naloxone Injectable  (Floorstock)  sodium chloride 0.9% Bolus  norepinephrine Infusion  vancomycin  IVPB  cefepime   IVPB  magnesium sulfate  IVPB  acetaminophen  IVPB ..  LORazepam   Injectable  HYDROmorphone PCA (1 mG/mL)  naloxone Injectable  ondansetron Injectable  vancomycin  IVPB  polyethylene glycol 3350  docusate sodium  senna  bisacodyl      ROS: Const:  _-__febrile   Eyes:___ENT:___CV: _-__chest pain  Resp: __-__sob  GI:___nausea ___vomiting ____abd pain ___npo ___clears ___full diet __bm  :___ Musk: _x__pain ___spasm  Skin:___ Neuro:  ___sedation___confusion____ numbness ___weakness ___paresthesia  Psych:___anxiety  Endo:___ Heme:___Allergy:___  __x__ all other systems reviewed and negative     01-11 @ 06:15993 mL/min/1.73M2      Hemoglobin: 8.6 g/dL (01-11 @ 06:59)  Hemoglobin: 9.4 g/dL (01-10 @ 06:33)      T(C): 37.2 (01-11-19 @ 08:45), Max: 37.3 (01-10-19 @ 14:25)  HR: 87 (01-11-19 @ 08:45) (80 - 93)  BP: 102/66 (01-11-19 @ 08:45) (94/63 - 102/66)  RR: 17 (01-11-19 @ 08:45) (15 - 17)  SpO2: 100% (01-11-19 @ 08:45) (97% - 100%)  Wt(kg): --     PHYSICAL EXAM:  Gen Appearance: __x_no acute distress _x__appropriate    Neuro: _x__SILT feet____ EOM Intact Psych: AAOX_3_, __x_mood/affect appropriate        Eyes: _x__conjunctiva WNL  _____ Pupils equal and round        ENT: __x_ears and nose atraumatic_x__ Hearing grossly intact        Neck: __x_trachea midline, no visible masses ___thyroid without palpable mass    Resp: _x__Nml WOB____No tactile fremitus ___clear to auscultation    Cardio: __x_extremities free from edema __x__pedal pulses palpable    GI/Abdomen: ___soft ___x__ Nontender___x___Nondistended_____HSM    Lymphatic: ___no palpable nodes in neck  ___no palpable nodes calves and feet    Skin/Wound: ___Incision, _x__Dressing c/d/i,   ____surrounding tissues soft,  ___drain/chest tube present____    Muscular: EHL _5__/5  Gastrocnemius_5__/5    __x_absent clubbing/cyanosis         ASSESSMENT:  This is a 54y old Male with a history of:  M51.36 M43.00 M48.06 M54.5  Handoff  MEWS Score  Depression  COPD (chronic obstructive pulmonary disease)  MS (multiple sclerosis)  Hepatitis C  Spinal stenosis of lumbar region, unspecified whether neurogenic claudication present  Spinal fusion failure, sequela  Other kyphosis of thoracolumbar region  Spinal fusion failure, sequela  Spinal stenosis of lumbar region, unspecified whether neurogenic claudication present  Other kyphosis of thoracolumbar region  Laminectomy of lumbar spine 3 or more levels with exploration or decompression or both  Spinal stenosis of lumbosacral region  MS (multiple sclerosis)  Depression  Hepatitis C  Spinal stenosis of lumbosacral region  Laminectomy of lumbar spine 3 or more levels with exploration or decompression or both  Surgery, elective        Recommended Treatment PLAN:    1. Continue current regimen as patient is tolerating and responding well.   Plan discussed with Dr. Melara

## 2019-01-11 NOTE — PROGRESS NOTE ADULT - SUBJECTIVE AND OBJECTIVE BOX
Ortho Note    Pt comfortable without complaints, pain controlled  Denies CP, SOB, N/V, numbness/tingling     Vital Signs Last 24 Hrs  T(C): 36.4 (01-11-19 @ 05:20), Max: 36.4 (01-11-19 @ 05:20)  T(F): 97.5 (01-11-19 @ 05:20), Max: 97.5 (01-11-19 @ 05:20)  HR: 81 (01-11-19 @ 05:20) (81 - 81)  BP: 94/63 (01-11-19 @ 05:20) (94/63 - 94/63)  BP(mean): --  RR: 16 (01-11-19 @ 05:20) (16 - 16)  SpO2: 99% (01-11-19 @ 05:20) (99% - 99%)    Awake and oriented, NAD  Back DSG C/D/I, HV x2 in place and holding drain  Pulses: 2+ DP/PT bilaterally   Sensation: s/s/sp/dp/t intact bilaterally   Motor: EHL/FHL/TA/GS 5/5 bilaterally  Bilateral LE mild swelling, toes WWP                         9.4    7.3   )-----------( 132      ( 10 Kristian 2019 06:33 )             27.6   10 Kristian 2019 06:33    135    |  99     |  9      ----------------------------<  122    3.6     |  25     |  0.57     Phos  2.1       10 Kristian 2019 06:33  Mg     1.7       10 Kristian 2019 06:33    Drain output:    01-10-19 @ 07:01  -  01-11-19 @ 07:00  --------------------------------------------------------  OUT:    Accordian: 130 mL  Total OUT: 130 mL    A/P: 53M POD#4 s/p ROSA, exploration of fusion, revision laminectomy, primary laminectomy L2-L4, re-instrumentation of posterior spinal fusion L1-sacrum   - Stable  - Pain Control  - DVT ppx: SCDs  - DC drain with 0 output, cont to monitor other drain  - cont vanc  - PT, WBS: WBAT  - dispo rehab when cleared by PT and medicine      Ortho Pager [phone]

## 2019-01-11 NOTE — PHYSICAL THERAPY INITIAL EVALUATION ADULT - PERTINENT HX OF CURRENT PROBLEM, REHAB EVAL
53M with PMH of MS, Hepatitis C, s/p Laminectomy fusion c/o low back pain x > 1 year.  Pt states back pain occasionally radiates down LE b/l but denies numbness or tingling down both lower extremities.. Now s/p Laminectomy of lumbar spine 3 or more levels with exploration or decompression or both  01/07/2019  Removal of hardware, exploration of fusion, revision laminectomy, primary laminectomy L2-L4, reinstrumentation of posterior spinal fusion L1-sacrum

## 2019-01-12 LAB
ANION GAP SERPL CALC-SCNC: 10 MMOL/L — SIGNIFICANT CHANGE UP (ref 5–17)
BUN SERPL-MCNC: 8 MG/DL — SIGNIFICANT CHANGE UP (ref 7–23)
CALCIUM SERPL-MCNC: 8 MG/DL — LOW (ref 8.4–10.5)
CHLORIDE SERPL-SCNC: 97 MMOL/L — SIGNIFICANT CHANGE UP (ref 96–108)
CO2 SERPL-SCNC: 29 MMOL/L — SIGNIFICANT CHANGE UP (ref 22–31)
CREAT SERPL-MCNC: 0.67 MG/DL — SIGNIFICANT CHANGE UP (ref 0.5–1.3)
GLUCOSE SERPL-MCNC: 111 MG/DL — HIGH (ref 70–99)
HCT VFR BLD CALC: 26.9 % — LOW (ref 39–50)
HGB BLD-MCNC: 9.1 G/DL — LOW (ref 13–17)
MCHC RBC-ENTMCNC: 31.7 PG — SIGNIFICANT CHANGE UP (ref 27–34)
MCHC RBC-ENTMCNC: 33.8 G/DL — SIGNIFICANT CHANGE UP (ref 32–36)
MCV RBC AUTO: 93.7 FL — SIGNIFICANT CHANGE UP (ref 80–100)
PLATELET # BLD AUTO: 184 K/UL — SIGNIFICANT CHANGE UP (ref 150–400)
POTASSIUM SERPL-MCNC: 4.2 MMOL/L — SIGNIFICANT CHANGE UP (ref 3.5–5.3)
POTASSIUM SERPL-SCNC: 4.2 MMOL/L — SIGNIFICANT CHANGE UP (ref 3.5–5.3)
RBC # BLD: 2.87 M/UL — LOW (ref 4.2–5.8)
RBC # FLD: 12.2 % — SIGNIFICANT CHANGE UP (ref 10.3–16.9)
SODIUM SERPL-SCNC: 136 MMOL/L — SIGNIFICANT CHANGE UP (ref 135–145)
VANCOMYCIN TROUGH SERPL-MCNC: 10.3 UG/ML — SIGNIFICANT CHANGE UP (ref 10–20)
WBC # BLD: 5.6 K/UL — SIGNIFICANT CHANGE UP (ref 3.8–10.5)
WBC # FLD AUTO: 5.6 K/UL — SIGNIFICANT CHANGE UP (ref 3.8–10.5)

## 2019-01-12 PROCEDURE — 99231 SBSQ HOSP IP/OBS SF/LOW 25: CPT

## 2019-01-12 RX ADMIN — SENNA PLUS 2 TABLET(S): 8.6 TABLET ORAL at 21:36

## 2019-01-12 RX ADMIN — Medication 100 MILLIGRAM(S): at 05:38

## 2019-01-12 RX ADMIN — Medication 100 MILLIGRAM(S): at 14:54

## 2019-01-12 RX ADMIN — Medication 1 PATCH: at 12:01

## 2019-01-12 RX ADMIN — OXYCODONE HYDROCHLORIDE 60 MILLIGRAM(S): 5 TABLET ORAL at 22:21

## 2019-01-12 RX ADMIN — OXYCODONE HYDROCHLORIDE 60 MILLIGRAM(S): 5 TABLET ORAL at 06:30

## 2019-01-12 RX ADMIN — Medication 300 MILLIGRAM(S): at 11:51

## 2019-01-12 RX ADMIN — Medication 2 MILLIGRAM(S): at 11:55

## 2019-01-12 RX ADMIN — Medication 1 PATCH: at 12:41

## 2019-01-12 RX ADMIN — OXYCODONE HYDROCHLORIDE 30 MILLIGRAM(S): 5 TABLET ORAL at 20:06

## 2019-01-12 RX ADMIN — Medication 300 MILLIGRAM(S): at 21:36

## 2019-01-12 RX ADMIN — OXYCODONE HYDROCHLORIDE 30 MILLIGRAM(S): 5 TABLET ORAL at 09:30

## 2019-01-12 RX ADMIN — OXYCODONE HYDROCHLORIDE 30 MILLIGRAM(S): 5 TABLET ORAL at 08:58

## 2019-01-12 RX ADMIN — OXYCODONE HYDROCHLORIDE 30 MILLIGRAM(S): 5 TABLET ORAL at 00:40

## 2019-01-12 RX ADMIN — POLYETHYLENE GLYCOL 3350 17 GRAM(S): 17 POWDER, FOR SOLUTION ORAL at 12:40

## 2019-01-12 RX ADMIN — OXYCODONE HYDROCHLORIDE 30 MILLIGRAM(S): 5 TABLET ORAL at 20:57

## 2019-01-12 RX ADMIN — OXYCODONE HYDROCHLORIDE 60 MILLIGRAM(S): 5 TABLET ORAL at 14:54

## 2019-01-12 RX ADMIN — OXYCODONE HYDROCHLORIDE 60 MILLIGRAM(S): 5 TABLET ORAL at 05:38

## 2019-01-12 RX ADMIN — OXYCODONE HYDROCHLORIDE 60 MILLIGRAM(S): 5 TABLET ORAL at 15:26

## 2019-01-12 RX ADMIN — Medication 1 PATCH: at 20:36

## 2019-01-12 RX ADMIN — OXYCODONE HYDROCHLORIDE 60 MILLIGRAM(S): 5 TABLET ORAL at 21:36

## 2019-01-12 RX ADMIN — Medication 2 MILLIGRAM(S): at 22:31

## 2019-01-12 NOTE — PROGRESS NOTE ADULT - SUBJECTIVE AND OBJECTIVE BOX
Orthopaedic Surgery Progress Note    Patient seen and examined. HIRA. Patient without complaints. Pain controlled. Denies CP, SOB, N/V, tactile fevers, calf pain. Cardiology and ID consults have ruled out endocarditis      Vital Signs Last 24 Hrs  T(C): 37 (12 Jan 2019 05:09), Max: 37.2 (11 Jan 2019 08:45)  T(F): 98.6 (12 Jan 2019 05:09), Max: 99 (11 Jan 2019 08:45)  HR: 92 (12 Jan 2019 05:09) (87 - 103)  BP: 97/57 (12 Jan 2019 05:09) (85/50 - 122/61)  BP(mean): --  RR: 16 (12 Jan 2019 05:09) (16 - 17)  SpO2: 96% (12 Jan 2019 05:09) (95% - 100%)        Physical Exam:  Pt laying comfortably in bed, NAD.  Skin warm and well perfused, no visible erythema/ecchymoses.  Dressing C/D/I; HV x 1   EHL/FHL/TA/GS 5/5 motor strength bilaterally  SLT in tact and equal to distal bilateral lower extremities  Calves nontender to palpation  DP/PT pulses 2+    LABS                        8.6    5.4   )-----------( 175      ( 11 Jan 2019 06:59 )             25.0                                01-11    138  |  99  |  9   ----------------------------<  136<H>  3.0<L>   |  28  |  0.65    Ca    7.9<L>      11 Jan 2019 06:59  Phos  2.3     01-11  Mg     1.9     01-11        A/P: 53M POD #4 s/p ROSA, exploration of fusion, revision lami, primary lami L2-L4, reinstrumentation of fusion L1-Sacrum    CONTINUE:        1. PT: WBAT  2. DVT prophylaxis: SCDs  3. Pain Control as needed   4. Dispo: home v rehab  5. Follow up drain output

## 2019-01-12 NOTE — PROGRESS NOTE ADULT - SUBJECTIVE AND OBJECTIVE BOX
Pt seen at bedside, working with PT    Vital Signs Last 24 Hrs  T(C): 36.3 (12 Jan 2019 18:21), Max: 37 (12 Jan 2019 05:09)  T(F): 97.3 (12 Jan 2019 18:21), Max: 98.6 (12 Jan 2019 05:09)  HR: 99 (12 Jan 2019 18:21) (86 - 128)  BP: 104/67 (12 Jan 2019 18:21) (97/57 - 129/58)  BP(mean): --  RR: 18 (12 Jan 2019 18:21) (16 - 18)  SpO2: 96% (12 Jan 2019 18:21) (95% - 99%)    AA and O x3, NAD  ncat  neck supple  no focal deficits    Basic Metabolic Panel in AM (01.12.19 @ 07:49)    Sodium, Serum: 136 mmol/L    Potassium, Serum: 4.2: Checked result, consistent with patient history mmol/L    Chloride, Serum: 97 mmol/L    Carbon Dioxide, Serum: 29 mmol/L    Anion Gap, Serum: 10 mmol/L    Blood Urea Nitrogen, Serum: 8 mg/dL    Creatinine, Serum: 0.67 mg/dL    Glucose, Serum: 111 mg/dL    Calcium, Total Serum: 8.0 mg/dL    eGFR if Non : 107: The units for eGFR are ml/min/1.73m2 (normalized body surface area). The  eGFR is calculated from a serum creatinine using the CKD-EPI equation.  Other variables required for calculation are race, age and sex. Among  patients with chronic kidney disease (CKD), the eGFR is useful in  determining the stage of disease according to KDOQI CKD classification.  All eGFR results are reported numerically with the following  interpretation.          GFR                    With                        Without     (ml/min/1.73 m2)    Kidney Damage       Kidney Damage        >= 90                    Stage 1                     Normal        60-89                    Stage 2                     Decreased GFR        30-59                    Stage 3         Stage 3        15-29                    Stage 4                     Stage 4        < 15                      Stage 5                     Stage 5  Each stage of CKD assumes that the associated GFR level has been in  effect for at least 3 months. Determination of stages one and two (with  eGFR > 59 ml/min/m2) requires estimation of kidney damage for at least 3  months as defined by structural or functional abnormalities.  Limitations: All estimates of GFR will be less accurate for patientsat  extremes of muscle mass (including but not limited to frail elderly,  critically ill, or cancer patients), those with unusual diets, and those  with conditions associated with reduced secretion or extrarenal  elimination of creatinine. The eGFR equation is not recommended for use  in patients with unstable creatinine levels. mL/min/1.73M2    eGFR if : 124: The units for eGFR are ml/min/1.73m2 (normalized body surface area). The  eGFR is calculated from a serum creatinine using the CKD-EPI equation.  Other variables required for calculation are race, age and sex. Among  patients with chronic kidney disease (CKD), the eGFR is useful in  determining the stage of disease according to KDOQI CKD classification.  All eGFR results are reported numerically with the following  interpretation.          GFR                    With                        Without     (ml/min/1.73 m2)    Kidney Damage       Kidney Damage        >= 90                    Stage 1                     Normal        60-89                    Stage 2                     Decreased GFR        30-59                    Stage 3         Stage 3        15-29                    Stage 4                     Stage 4        < 15                      Stage 5                     Stage 5  Each stage of CKD assumes that the associated GFR level has been in  effect for at least 3 months. Determination of stages one and two (with  eGFR > 59 ml/min/m2) requires estimation of kidney damage for at least 3  months as defined by structural or functional abnormalities.  Limitations: All estimates of GFR will be less accurate for patientsat  extremes of muscle mass (including but not limited to frail elderly,  critically ill, or cancer patients), those with unusual diets, and those  with conditions associated with reduced secretion or extrarenal  elimination of creatinine. The eGFR equation is not recommended for use  in patients with unstable creatinine levels. mL/min/1.73M2    Complete Blood Count in AM (01.12.19 @ 07:49)    WBC Count: 5.6 K/uL    RBC Count: 2.87 M/uL    Hemoglobin: 9.1 g/dL    Hematocrit: 26.9 %    Mean Cell Volume: 93.7 fL    Mean Cell Hemoglobin: 31.7 pg    Mean Cell Hemoglobin Conc: 33.8 g/dL    Red Cell Distrib Width: 12.2 %    Platelet Count - Automated: 184 K/uL

## 2019-01-13 LAB
ANION GAP SERPL CALC-SCNC: 12 MMOL/L — SIGNIFICANT CHANGE UP (ref 5–17)
BUN SERPL-MCNC: 7 MG/DL — SIGNIFICANT CHANGE UP (ref 7–23)
CALCIUM SERPL-MCNC: 8 MG/DL — LOW (ref 8.4–10.5)
CHLORIDE SERPL-SCNC: 95 MMOL/L — LOW (ref 96–108)
CO2 SERPL-SCNC: 28 MMOL/L — SIGNIFICANT CHANGE UP (ref 22–31)
CREAT SERPL-MCNC: 0.64 MG/DL — SIGNIFICANT CHANGE UP (ref 0.5–1.3)
CULTURE RESULTS: SIGNIFICANT CHANGE UP
CULTURE RESULTS: SIGNIFICANT CHANGE UP
GLUCOSE SERPL-MCNC: 107 MG/DL — HIGH (ref 70–99)
HCT VFR BLD CALC: 27.9 % — LOW (ref 39–50)
HGB BLD-MCNC: 9.3 G/DL — LOW (ref 13–17)
MCHC RBC-ENTMCNC: 31.4 PG — SIGNIFICANT CHANGE UP (ref 27–34)
MCHC RBC-ENTMCNC: 33.3 G/DL — SIGNIFICANT CHANGE UP (ref 32–36)
MCV RBC AUTO: 94.3 FL — SIGNIFICANT CHANGE UP (ref 80–100)
PLATELET # BLD AUTO: 209 K/UL — SIGNIFICANT CHANGE UP (ref 150–400)
POTASSIUM SERPL-MCNC: 4 MMOL/L — SIGNIFICANT CHANGE UP (ref 3.5–5.3)
POTASSIUM SERPL-SCNC: 4 MMOL/L — SIGNIFICANT CHANGE UP (ref 3.5–5.3)
RBC # BLD: 2.96 M/UL — LOW (ref 4.2–5.8)
RBC # FLD: 12.4 % — SIGNIFICANT CHANGE UP (ref 10.3–16.9)
SODIUM SERPL-SCNC: 135 MMOL/L — SIGNIFICANT CHANGE UP (ref 135–145)
SPECIMEN SOURCE: SIGNIFICANT CHANGE UP
SPECIMEN SOURCE: SIGNIFICANT CHANGE UP
WBC # BLD: 6.3 K/UL — SIGNIFICANT CHANGE UP (ref 3.8–10.5)
WBC # FLD AUTO: 6.3 K/UL — SIGNIFICANT CHANGE UP (ref 3.8–10.5)

## 2019-01-13 PROCEDURE — 73502 X-RAY EXAM HIP UNI 2-3 VIEWS: CPT | Mod: 26,RT

## 2019-01-13 PROCEDURE — 72100 X-RAY EXAM L-S SPINE 2/3 VWS: CPT | Mod: 26

## 2019-01-13 PROCEDURE — 99231 SBSQ HOSP IP/OBS SF/LOW 25: CPT

## 2019-01-13 RX ORDER — VANCOMYCIN HCL 1 G
1750 VIAL (EA) INTRAVENOUS EVERY 12 HOURS
Qty: 0 | Refills: 0 | Status: DISCONTINUED | OUTPATIENT
Start: 2019-01-13 | End: 2019-01-13

## 2019-01-13 RX ORDER — OXYCODONE HYDROCHLORIDE 5 MG/1
80 TABLET ORAL EVERY 8 HOURS
Qty: 0 | Refills: 0 | Status: DISCONTINUED | OUTPATIENT
Start: 2019-01-13 | End: 2019-01-15

## 2019-01-13 RX ORDER — OXYCODONE HYDROCHLORIDE 5 MG/1
30 TABLET ORAL EVERY 4 HOURS
Qty: 0 | Refills: 0 | Status: DISCONTINUED | OUTPATIENT
Start: 2019-01-13 | End: 2019-01-15

## 2019-01-13 RX ORDER — ALPRAZOLAM 0.25 MG
1 TABLET ORAL ONCE
Qty: 0 | Refills: 0 | Status: DISCONTINUED | OUTPATIENT
Start: 2019-01-13 | End: 2019-01-13

## 2019-01-13 RX ORDER — ACETAMINOPHEN 500 MG
650 TABLET ORAL ONCE
Qty: 0 | Refills: 0 | Status: COMPLETED | OUTPATIENT
Start: 2019-01-13 | End: 2019-01-13

## 2019-01-13 RX ORDER — ACETAMINOPHEN 500 MG
1000 TABLET ORAL ONCE
Qty: 0 | Refills: 0 | Status: DISCONTINUED | OUTPATIENT
Start: 2019-01-13 | End: 2019-01-13

## 2019-01-13 RX ADMIN — Medication 100 MILLIGRAM(S): at 05:55

## 2019-01-13 RX ADMIN — Medication 1 PATCH: at 06:24

## 2019-01-13 RX ADMIN — Medication 100 MILLIGRAM(S): at 21:53

## 2019-01-13 RX ADMIN — OXYCODONE HYDROCHLORIDE 80 MILLIGRAM(S): 5 TABLET ORAL at 15:41

## 2019-01-13 RX ADMIN — OXYCODONE HYDROCHLORIDE 30 MILLIGRAM(S): 5 TABLET ORAL at 18:10

## 2019-01-13 RX ADMIN — OXYCODONE HYDROCHLORIDE 30 MILLIGRAM(S): 5 TABLET ORAL at 10:23

## 2019-01-13 RX ADMIN — Medication 100 MILLIGRAM(S): at 14:42

## 2019-01-13 RX ADMIN — OXYCODONE HYDROCHLORIDE 60 MILLIGRAM(S): 5 TABLET ORAL at 06:50

## 2019-01-13 RX ADMIN — Medication 650 MILLIGRAM(S): at 21:53

## 2019-01-13 RX ADMIN — OXYCODONE HYDROCHLORIDE 80 MILLIGRAM(S): 5 TABLET ORAL at 14:41

## 2019-01-13 RX ADMIN — Medication 1 MILLIGRAM(S): at 13:30

## 2019-01-13 RX ADMIN — OXYCODONE HYDROCHLORIDE 80 MILLIGRAM(S): 5 TABLET ORAL at 21:54

## 2019-01-13 RX ADMIN — OXYCODONE HYDROCHLORIDE 60 MILLIGRAM(S): 5 TABLET ORAL at 05:55

## 2019-01-13 RX ADMIN — OXYCODONE HYDROCHLORIDE 80 MILLIGRAM(S): 5 TABLET ORAL at 22:45

## 2019-01-13 RX ADMIN — Medication 650 MILLIGRAM(S): at 22:45

## 2019-01-13 RX ADMIN — OXYCODONE HYDROCHLORIDE 30 MILLIGRAM(S): 5 TABLET ORAL at 11:23

## 2019-01-13 NOTE — PROVIDER CONTACT NOTE (OTHER) - SITUATION
pt reports falling at bedside while attempting to urinate in urinal. pt reports hitting right side of head, and right hip during fall.

## 2019-01-13 NOTE — PROGRESS NOTE ADULT - SUBJECTIVE AND OBJECTIVE BOX
Pt seen and examined at bedside. working with PT.  no new complaints    Vital Signs Last 24 Hrs  T(C): 37.7 (13 Jan 2019 12:14), Max: 37.7 (13 Jan 2019 12:14)  T(F): 99.8 (13 Jan 2019 12:14), Max: 99.8 (13 Jan 2019 12:14)  HR: 112 (13 Jan 2019 12:14) (85 - 112)  BP: 97/63 (13 Jan 2019 12:14) (95/59 - 110/72)  BP(mean): --  RR: 16 (13 Jan 2019 12:14) (15 - 18)  SpO2: 98% (13 Jan 2019 12:14) (96% - 100%)    AA and O x 3 NAD  ncat   neck supple  s1s2 RRR  lungs CTA b/l  abd soft  ext no edema  no focal deficits    Basic Metabolic Panel in AM (01.13.19 @ 07:22)    Sodium, Serum: 135 mmol/L    Potassium, Serum: 4.0 mmol/L    Chloride, Serum: 95 mmol/L    Carbon Dioxide, Serum: 28 mmol/L    Anion Gap, Serum: 12 mmol/L    Blood Urea Nitrogen, Serum: 7 mg/dL    Creatinine, Serum: 0.64 mg/dL    Glucose, Serum: 107 mg/dL    Calcium, Total Serum: 8.0 mg/dL    eGFR if Non : 109: The units for eGFR are ml/min/1.73m2 (normalized body surface area). The  eGFR is calculated from a serum creatinine using the CKD-EPI equation.  Other variables required for calculation are race, age and sex. Among  patients with chronic kidney disease (CKD), the eGFR is useful in  determining the stage of disease according to KDOQI CKD classification.  All eGFR results are reported numerically with the following  interpretation.          GFR                    With                        Without     (ml/min/1.73 m2)    Kidney Damage       Kidney Damage        >= 90                    Stage 1                     Normal        60-89                    Stage 2                     Decreased GFR        30-59                    Stage 3         Stage 3        15-29                    Stage 4                     Stage 4        < 15                      Stage 5                     Stage 5  Each stage of CKD assumes that the associated GFR level has been in  effect for at least 3 months. Determination of stages one and two (with  eGFR > 59 ml/min/m2) requires estimation of kidney damage for at least 3  months as defined by structural or functional abnormalities.  Limitations: All estimates of GFR will be less accurate for patientsat  extremes of muscle mass (including but not limited to frail elderly,  critically ill, or cancer patients), those with unusual diets, and those  with conditions associated with reduced secretion or extrarenal  elimination of creatinine. The eGFR equation is not recommended for use  in patients with unstable creatinine levels. mL/min/1.73M2    eGFR if : 127: The units for eGFR are ml/min/1.73m2 (normalized body surface area). The  eGFR is calculated from a serum creatinine using the CKD-EPI equation.  Other variables required for calculation are race, age and sex. Among  patients with chronic kidney disease (CKD), the eGFR is useful in  determining the stage of disease according to KDOQI CKD classification.  All eGFR results are reported numerically with the following  interpretation.          GFR                    With                        Without     (ml/min/1.73 m2)    Kidney Damage       Kidney Damage        >= 90                    Stage 1                     Normal        60-89                    Stage 2                     Decreased GFR        30-59                    Stage 3         Stage 3        15-29                    Stage 4                     Stage 4        < 15                      Stage 5                     Stage 5  Each stage of CKD assumes that the associated GFR level has been in  effect for at least 3 months. Determination of stages one and two (with  eGFR > 59 ml/min/m2) requires estimation of kidney damage for at least 3  months as defined by structural or functional abnormalities.  Limitations: All estimates of GFR will be less accurate for patientsat  extremes of muscle mass (including but not limited to frail elderly,  critically ill, or cancer patients), those with unusual diets, and those  with conditions associated with reduced secretion or extrarenal  elimination of creatinine. The eGFR equation is not recommended for use  in patients with unstable creatinine levels. mL/min/1.73M2

## 2019-01-13 NOTE — PROGRESS NOTE ADULT - SUBJECTIVE AND OBJECTIVE BOX
Orthopaedic Surgery Progress Note    Patient seen and examined. HIRA. Patient without complaints. Pain controlled. Denies CP, SOB, N/V, tactile fevers, calf pain. Cardiology and ID consults have ruled out endocarditis      Vital Signs Last 24 Hrs  T(C): 37.1 (13 Jan 2019 06:22), Max: 37.1 (13 Jan 2019 04:20)  T(F): 98.8 (13 Jan 2019 06:22), Max: 98.8 (13 Jan 2019 04:20)  HR: 85 (13 Jan 2019 06:22) (85 - 128)  BP: 96/64 (13 Jan 2019 06:22) (95/59 - 129/58)  BP(mean): --  RR: 16 (13 Jan 2019 06:22) (15 - 18)  SpO2: 99% (13 Jan 2019 06:22) (96% - 100%)        Physical Exam:  Pt laying comfortably in bed, NAD.  Skin warm and well perfused, no visible erythema/ecchymoses.  Dressing C/D/I; HV x 1   EHL/FHL/TA/GS 5/5 motor strength LLE, RLE EHL/TA 3/5 FHL/GS 5/5  SLT in tact and equal to distal bilateral lower extremities  Calves nontender to palpation  DP/PT pulses 2+    LABS                        8.6    5.4   )-----------( 175      ( 11 Jan 2019 06:59 )             25.0                                01-11    138  |  99  |  9   ----------------------------<  136<H>  3.0<L>   |  28  |  0.65    Ca    7.9<L>      11 Jan 2019 06:59  Phos  2.3     01-11  Mg     1.9     01-11        A/P: 53M POD #5 s/p ROSA, exploration of fusion, revision lami, primary lami L2-L4, reinstrumentation of fusion L1-Sacrum    CONTINUE:        1. PT: WBAT  2. DVT prophylaxis: SCDs  3. Pain Control as needed   4. Dispo:  rehab  5. drains removed Orthopaedic Surgery Progress Note    Patient seen and examined. HIRA. Patient without complaints. Pain controlled. Denies CP, SOB, N/V, tactile fevers, calf pain. Cardiology and ID consults have ruled out endocarditis      Vital Signs Last 24 Hrs  T(C): 37.1 (13 Jan 2019 06:22), Max: 37.1 (13 Jan 2019 04:20)  T(F): 98.8 (13 Jan 2019 06:22), Max: 98.8 (13 Jan 2019 04:20)  HR: 85 (13 Jan 2019 06:22) (85 - 128)  BP: 96/64 (13 Jan 2019 06:22) (95/59 - 129/58)  BP(mean): --  RR: 16 (13 Jan 2019 06:22) (15 - 18)  SpO2: 99% (13 Jan 2019 06:22) (96% - 100%)        Physical Exam:  Pt laying comfortably in bed, NAD.  Skin warm and well perfused, no visible erythema/ecchymoses.  Dressing C/D/I;  EHL/FHL/TA/GS 5/5 motor strength LLE, RLE EHL/TA 3/5 FHL/GS 5/5  SLT in tact and equal to distal bilateral lower extremities  Calves nontender to palpation  DP/PT pulses 2+    LABS                        8.6    5.4   )-----------( 175      ( 11 Jan 2019 06:59 )             25.0                                01-11    138  |  99  |  9   ----------------------------<  136<H>  3.0<L>   |  28  |  0.65    Ca    7.9<L>      11 Jan 2019 06:59  Phos  2.3     01-11  Mg     1.9     01-11        A/P: 53M POD #5 s/p ROSA, exploration of fusion, revision lami, primary lami L2-L4, reinstrumentation of fusion L1-Sacrum    CONTINUE:        1. PT: WBAT  2. DVT prophylaxis: SCDs  3. Pain Control as needed   4. Dispo:  rehab  5. drains removed

## 2019-01-14 LAB
ANION GAP SERPL CALC-SCNC: 10 MMOL/L — SIGNIFICANT CHANGE UP (ref 5–17)
BUN SERPL-MCNC: 9 MG/DL — SIGNIFICANT CHANGE UP (ref 7–23)
CALCIUM SERPL-MCNC: 8.3 MG/DL — LOW (ref 8.4–10.5)
CHLORIDE SERPL-SCNC: 100 MMOL/L — SIGNIFICANT CHANGE UP (ref 96–108)
CO2 SERPL-SCNC: 28 MMOL/L — SIGNIFICANT CHANGE UP (ref 22–31)
CREAT SERPL-MCNC: 0.8 MG/DL — SIGNIFICANT CHANGE UP (ref 0.5–1.3)
GLUCOSE SERPL-MCNC: 125 MG/DL — HIGH (ref 70–99)
HCT VFR BLD CALC: 27 % — LOW (ref 39–50)
HGB BLD-MCNC: 9 G/DL — LOW (ref 13–17)
MCHC RBC-ENTMCNC: 31.1 PG — SIGNIFICANT CHANGE UP (ref 27–34)
MCHC RBC-ENTMCNC: 33.3 G/DL — SIGNIFICANT CHANGE UP (ref 32–36)
MCV RBC AUTO: 93.4 FL — SIGNIFICANT CHANGE UP (ref 80–100)
PLATELET # BLD AUTO: 244 K/UL — SIGNIFICANT CHANGE UP (ref 150–400)
POTASSIUM SERPL-MCNC: 4.4 MMOL/L — SIGNIFICANT CHANGE UP (ref 3.5–5.3)
POTASSIUM SERPL-SCNC: 4.4 MMOL/L — SIGNIFICANT CHANGE UP (ref 3.5–5.3)
RBC # BLD: 2.89 M/UL — LOW (ref 4.2–5.8)
RBC # FLD: 12.8 % — SIGNIFICANT CHANGE UP (ref 10.3–16.9)
SODIUM SERPL-SCNC: 138 MMOL/L — SIGNIFICANT CHANGE UP (ref 135–145)
WBC # BLD: 4.9 K/UL — SIGNIFICANT CHANGE UP (ref 3.8–10.5)
WBC # FLD AUTO: 4.9 K/UL — SIGNIFICANT CHANGE UP (ref 3.8–10.5)

## 2019-01-14 PROCEDURE — 99233 SBSQ HOSP IP/OBS HIGH 50: CPT

## 2019-01-14 RX ORDER — FAMOTIDINE 10 MG/ML
20 INJECTION INTRAVENOUS DAILY
Qty: 0 | Refills: 0 | Status: DISCONTINUED | OUTPATIENT
Start: 2019-01-14 | End: 2019-01-15

## 2019-01-14 RX ADMIN — FAMOTIDINE 20 MILLIGRAM(S): 10 INJECTION INTRAVENOUS at 00:28

## 2019-01-14 RX ADMIN — OXYCODONE HYDROCHLORIDE 30 MILLIGRAM(S): 5 TABLET ORAL at 10:45

## 2019-01-14 RX ADMIN — OXYCODONE HYDROCHLORIDE 30 MILLIGRAM(S): 5 TABLET ORAL at 03:58

## 2019-01-14 RX ADMIN — OXYCODONE HYDROCHLORIDE 30 MILLIGRAM(S): 5 TABLET ORAL at 21:15

## 2019-01-14 RX ADMIN — OXYCODONE HYDROCHLORIDE 80 MILLIGRAM(S): 5 TABLET ORAL at 15:00

## 2019-01-14 RX ADMIN — Medication 100 MILLIGRAM(S): at 21:29

## 2019-01-14 RX ADMIN — OXYCODONE HYDROCHLORIDE 30 MILLIGRAM(S): 5 TABLET ORAL at 09:58

## 2019-01-14 RX ADMIN — Medication 2 MILLIGRAM(S): at 00:02

## 2019-01-14 RX ADMIN — OXYCODONE HYDROCHLORIDE 80 MILLIGRAM(S): 5 TABLET ORAL at 23:40

## 2019-01-14 RX ADMIN — OXYCODONE HYDROCHLORIDE 30 MILLIGRAM(S): 5 TABLET ORAL at 20:16

## 2019-01-14 RX ADMIN — OXYCODONE HYDROCHLORIDE 80 MILLIGRAM(S): 5 TABLET ORAL at 07:40

## 2019-01-14 RX ADMIN — Medication 100 MILLIGRAM(S): at 07:09

## 2019-01-14 RX ADMIN — OXYCODONE HYDROCHLORIDE 80 MILLIGRAM(S): 5 TABLET ORAL at 07:09

## 2019-01-14 RX ADMIN — OXYCODONE HYDROCHLORIDE 30 MILLIGRAM(S): 5 TABLET ORAL at 04:50

## 2019-01-14 RX ADMIN — Medication 2 MILLIGRAM(S): at 21:29

## 2019-01-14 RX ADMIN — OXYCODONE HYDROCHLORIDE 80 MILLIGRAM(S): 5 TABLET ORAL at 13:53

## 2019-01-14 NOTE — PROGRESS NOTE ADULT - SUBJECTIVE AND OBJECTIVE BOX
Orthopaedic Surgery Progress Note    Patient seen and examined. HIRA. Patient without complaints. Pain controlled. Denies CP, SOB, N/V, tactile fevers, calf pain.      Vital Signs Last 24 Hrs  T(C): 37 (14 Jan 2019 09:55), Max: 38.2 (13 Jan 2019 20:48)  T(F): 98.6 (14 Jan 2019 09:55), Max: 100.8 (13 Jan 2019 20:48)  HR: 96 (14 Jan 2019 09:55) (86 - 100)  BP: 102/66 (14 Jan 2019 09:55) (96/57 - 102/66)  BP(mean): --  RR: 16 (14 Jan 2019 09:55) (16 - 18)  SpO2: 100% (14 Jan 2019 09:55) (96% - 100%)    Physical Exam:  Pt laying comfortably in bed, NAD.  Skin warm and well perfused, no visible erythema/ecchymoses.  Dressing C/D/I  EHL/FHL/TA/GS 5/5 motor strength bilaterally  SLT in tact and equal to distal bilateral lower extremities  Calves nontender to palpation bilaterally  DP/PT pulses 2+    LABS                        9.0    4.9   )-----------( 244      ( 14 Jan 2019 10:33 )             27.0                                01-14    138  |  100  |  9   ----------------------------<  125<H>  4.4   |  28  |  0.80    Ca    8.3<L>      14 Jan 2019 10:33      A/P: 53M POD #7 s/p ROSA, exploration of fusion, revision Lami, reinstrumentation L1-Sacrum    CONTINUE:        1. PT, WBAT   2. DVT prophylaxis: SCDs  3. Pain Control as needed   4. Dispo: Acute Rehab pending placement   5. Appreciate pain management recs

## 2019-01-14 NOTE — PROGRESS NOTE ADULT - SUBJECTIVE AND OBJECTIVE BOX
CC: Feeling well. No complaints.   ROS negative.     Vital Signs Last 24 Hrs  T(C): 36.8 (14 Jan 2019 17:20), Max: 38.2 (13 Jan 2019 20:48)  T(F): 98.2 (14 Jan 2019 17:20), Max: 100.8 (13 Jan 2019 20:48)  HR: 98 (14 Jan 2019 17:20) (86 - 100)  BP: 103/61 (14 Jan 2019 17:20) (96/57 - 103/61)  BP(mean): --  RR: 17 (14 Jan 2019 17:20) (16 - 18)  SpO2: 95% (14 Jan 2019 17:20) (95% - 100%)    PHYSICAL EXAMINATION  * General: Not in acute distress. Awake and alert. Lying comfortably in bed.  * Neck: no JVD, supple.  * Lungs: Clear to auscultation, no rales, no wheezes.  * Cardio: Regular rate and rhythm, no murmurs, no rubs, no gallops. Good peripheral pulses.  * Abdomen: Soft, non-tender, non-distended, tympanic to percussion, no rebound, no guarding, no rigidity. Bowel sounds present. No suprapubic or CVA tenderness.  * Neuro: Alert and oriented x 3. No focal deficits. Motor strength is 5/5 throughout. Sensation intact. Cranial nerves II-XII grossly intact.                           9.0    4.9   )-----------( 244      ( 14 Jan 2019 10:33 )             27.0   01-14    138  |  100  |  9   ----------------------------<  125<H>  4.4   |  28  |  0.80    Ca    8.3<L>      14 Jan 2019 10:33    MEDICATIONS  (STANDING):  ALPRAZolam 2 milliGRAM(s) Oral two times a day  docusate sodium 100 milliGRAM(s) Oral three times a day  famotidine    Tablet 20 milliGRAM(s) Oral daily  nicotine -   7 mG/24Hr(s) Patch 1 patch Transdermal daily  oxyCODONE  ER Tablet 80 milliGRAM(s) Oral every 8 hours  polyethylene glycol 3350 17 Gram(s) Oral daily  senna 2 Tablet(s) Oral at bedtime    MEDICATIONS  (PRN):  bisacodyl 5 milliGRAM(s) Oral every 12 hours PRN Constipation  HYDROmorphone  Injectable 1 milliGRAM(s) IV Push every 2 hours PRN breakthrough pain  oxyCODONE    IR 30 milliGRAM(s) Oral every 4 hours PRN Severe Pain (7 - 10)

## 2019-01-14 NOTE — CONSULT NOTE ADULT - SUBJECTIVE AND OBJECTIVE BOX
Patient is a 54y old  Male who presents with a chief complaint of low back pain (13 Jan 2019 16:40)       HPI:  53M s/p Laminectomy fusion c/o low back pain x > 1 year.  Pt states back pain occasionally radiates down LE b/l but denies numbness or tingling down both lower extremities.  Pt currently takes 60mg MS Contin, 30mg oxycodone for his pain and states his pain is not well controlled.  Pt notes they have a hx of leg swelling which has been chronic in nature.  Pt ambulates independently.  Pt states they are lethargic today secondary to waking up early this morning for surgery.   Pt denies fever, chills, recent illness, CP, SOB, or being on any anticoagulation medication.    Pt has failed conservative treatment for his symptoms   Presents for elective exploration of spinal fusion, revision TLIF L1-Sacrum with cage implantation. (04 Jan 2019 13:46)      PAST MEDICAL & SURGICAL HISTORY:  Depression  MS (multiple sclerosis)  Hepatitis C  Surgery, elective: clavical sx  Surgery, elective: left ankle 2012  Surgery, elective: spinal fusion x 2  lumbar      MEDICATIONS  (STANDING):  ALPRAZolam 2 milliGRAM(s) Oral two times a day  docusate sodium 100 milliGRAM(s) Oral three times a day  famotidine    Tablet 20 milliGRAM(s) Oral daily  nicotine -   7 mG/24Hr(s) Patch 1 patch Transdermal daily  oxyCODONE  ER Tablet 80 milliGRAM(s) Oral every 8 hours  polyethylene glycol 3350 17 Gram(s) Oral daily  senna 2 Tablet(s) Oral at bedtime    MEDICATIONS  (PRN):  bisacodyl 5 milliGRAM(s) Oral every 12 hours PRN Constipation  HYDROmorphone  Injectable 1 milliGRAM(s) IV Push every 2 hours PRN breakthrough pain  oxyCODONE    IR 30 milliGRAM(s) Oral every 4 hours PRN Severe Pain (7 - 10)      Social History: lives with his mother in an elevator accessible apartment building, no previous home care services    Functional Level Prior to Admission: ADL independent, walks with a cane    FAMILY HISTORY:      CBC Full  -  ( 13 Jan 2019 07:22 )  WBC Count : 6.3 K/uL  Hemoglobin : 9.3 g/dL  Hematocrit : 27.9 %  Platelet Count - Automated : 209 K/uL  Mean Cell Volume : 94.3 fL  Mean Cell Hemoglobin : 31.4 pg  Mean Cell Hemoglobin Concentration : 33.3 g/dL  Auto Neutrophil # : x  Auto Lymphocyte # : x  Auto Monocyte # : x  Auto Eosinophil # : x  Auto Basophil # : x  Auto Neutrophil % : x  Auto Lymphocyte % : x  Auto Monocyte % : x  Auto Eosinophil % : x  Auto Basophil % : x      01-13    135  |  95<L>  |  7   ----------------------------<  107<H>  4.0   |  28  |  0.64    Ca    8.0<L>      13 Jan 2019 07:22              Radiology:    < from: Xray Chest 1 View- PORTABLE-Urgent (01.09.19 @ 13:42) >  EXAM:  XR CHEST PORTABLE URGENT 1V                          PROCEDURE DATE:  01/09/2019          INTERPRETATION:  Chest x-ray    Indication: Aspiration    2 frontal views of the chest are compared to the prior study dated   1/8/2019. Normal heart size. No pulmonary consolidation is seen. Right   basilar atelectasis has resolved. No pleural effusion or pneumothorax.      IMPRESSION: No pulmonary infiltrates.            Vital Signs Last 24 Hrs  T(C): 37.1 (14 Jan 2019 03:54), Max: 38.2 (13 Jan 2019 20:48)  T(F): 98.8 (14 Jan 2019 03:54), Max: 100.8 (13 Jan 2019 20:48)  HR: 86 (14 Jan 2019 03:54) (86 - 112)  BP: 97/62 (14 Jan 2019 03:54) (96/57 - 110/72)  BP(mean): --  RR: 18 (14 Jan 2019 03:54) (16 - 18)  SpO2: 100% (14 Jan 2019 03:54) (96% - 100%)    REVIEW OF SYSTEMS:    CONSTITUTIONAL: No fever, weight loss, or fatigue  EYES: No eye pain, visual disturbances, or discharge  ENMT:  No difficulty hearing, tinnitus, vertigo; No sinus or throat pain  NECK: No pain or stiffness  BREASTS: No pain, masses, or nipple discharge  RESPIRATORY: No cough, wheezing, chills or hemoptysis; No shortness of breath  CARDIOVASCULAR: No chest pain, palpitations, dizziness, or leg swelling  GASTROINTESTINAL: No abdominal or epigastric pain. No nausea, vomiting, or hematemesis; No diarrhea or constipation. No melena or hematochezia.  GENITOURINARY: No dysuria, frequency, hematuria, or incontinence  NEUROLOGICAL: No headaches, memory loss, loss of strength, numbness, or tremors  SKIN: No itching, burning, rashes, or lesions   LYMPH NODES: No enlarged glands  ENDOCRINE: No heat or cold intolerance; No hair loss  MUSCULOSKELETAL: incisional pain  PSYCHIATRIC: No depression, anxiety, mood swings, or difficulty sleeping  HEME/LYMPH: No easy bruising, or bleeding gums  ALLERGY AND IMMUNOLOGIC: No hives or eczema  VASCULAR: no swelling, erythema      Physical Exam: thin appearing 53 yo  gentleman lying in semi Faust's position, feels pain is under control    Head: normocephalic, atraumatic    Eyes: PERRLA, EOMI, no nystagmus, sclera anicteric    ENT: nasal discharge, uvula midline, no oropharyngeal erythema/exudate    Neck: supple, negative JVD, negative carotid bruits, no thyromegaly    Chest: CTA bilaterally, neg wheeze, rhonchi, rales, crackles, egophany    Cardiovascular: regular rate and rhythm, neg murmurs/rubs/gallops    Abdomen: soft, non distended, non tender, negative rebound/guarding, normal bowel sounds, neg hepatosplenomegaly    Extremities: WWP, neg cyanosis/clubbing/edema, negative calf tenderness to palpation, negative Beverly's sign    :     Neurologic Exam:    Alert and oriented to person, place, date/year, speech fluent w/o dysarthria, recent and remote memory intact, repetition intact, comprehension intact,       Motor Exam:    Upper Extremities:     RIght:   4/5   /intrinsics               5/5  wrist flexors/extensors               5/5  biceps/triceps               5/5  deltoid               negative drift    Left :   4/5  /intrinsics               5/5  wrist flexors/extensors               5/5 biceps/triceps               5/5 deltoid               negative drift    Lower Extremities:                 Right:    4/5  DF/PF/ evertors/ invertors                4/5  Quad/ hamstrings                4/5  hip flexors/adductors/abductors                negative drift    Left:      4/5  DF/PF/ evertors/ invertors                4/5  Quad/ hamstrings                4/5  hip flexors/adductors/abductors                negative drift      Sensory:    intact to LT/PP in all UE/LE dermatomes    DTR:            = biceps/     triceps/     brachioradialis                      = patella/   medial hamstring/ankle                      neg clonus                      neg Babinski                      neg Hoffmans      Gait:  not tested        PM&R Impression:    1) lumbar myofascial pain/ post op incisional pain  2) s/p POD #5 s/p ROSA, exploration of fusion, revision lami, primary lami L2-L4, reinstrumentation of fusion L1-Sacrum  3) deconditioned  4) gait dysfunction    Recommendations:    1) Physical therapy focusing on therapeutic exercises, bed mobility/transfer out of bed evaluation, progressive ambulation with assistive devices prn.    2) Anticipated Disposition Plan/Recs: rehab placement, ? candidate for acute rehab

## 2019-01-14 NOTE — PROGRESS NOTE ADULT - SUBJECTIVE AND OBJECTIVE BOX
Pain Management Progress Note - [facility] [phone]    HPI: Patient seen and examined today, patient laying down in bed, in no apparent distress. Patient sitting in the bed, in no apparent distress. Patient Axox3, denies secondary side effects, no s/s of oversedation, pt expresses pain relief with current pain medication regimen. Patient to see Dr. Abreu for pain management, patient to follow up with Dr. Abreu outpatient.       Pain is ___ sharp _x___dull ___burning _x__achy ___ Intensity: ____ mild _x__mod _x__severe     Location _x___surgical site ____cervical __x___lumbar ____abd ____upper ext____lower ext    Worse with __x__activity __x__movement _____physical therapy___ Rest    Improved with __x__medication __x__rest ____physical therapy      PAST MEDICAL & SURGICAL HISTORY:  Depression  MS (multiple sclerosis)  Hepatitis C  Surgery, elective: clavical sx  Surgery, elective: left ankle 2012  Surgery, elective: spinal fusion x 2  lumbar  Depression  COPD (chronic obstructive pulmonary disease)  MS (multiple sclerosis)  Hepatitis C  Spinal stenosis of lumbar region, unspecified whether neurogenic claudication present  Spinal fusion failure, sequela  Other kyphosis of thoracolumbar region  Spinal fusion failure, sequela  Spinal stenosis of lumbar region, unspecified whether neurogenic claudication present  Other kyphosis of thoracolumbar region  Laminectomy of lumbar spine 3 or more levels with exploration or decompression or both  Spinal stenosis of lumbosacral region  MS (multiple sclerosis)  Depression  Hepatitis C  Spinal stenosis of lumbosacral region  Laminectomy of lumbar spine 3 or more levels with exploration or decompression or both  Surgery, elective      BUpivacaine liposome 1.3% Injectable (no eMAR)  oxyCODONE  ER Tablet  oxyCODONE    IR  HYDROmorphone  Injectable  acetaminophen   Tablet ..  HYDROmorphone  Injectable  lactated ringers.  vancomycin  IVPB  HYDROmorphone  Injectable  HYDROmorphone PCA (1 mG/mL)  HYDROmorphone PCA (1 mG/mL) Rescue Clinician Bolus  naloxone Injectable  ondansetron Injectable  HYDROmorphone  Injectable  HYDROmorphone  Injectable  ALPRAZolam  HYDROmorphone PCA (1 mG/mL) Rescue Clinician Bolus  naloxone Injectable  norepinephrine Infusion  vancomycin  IVPB  cefepime   IVPB  magnesium sulfate  IVPB  acetaminophen  IVPB ..  LORazepam   Injectable  HYDROmorphone PCA (1 mG/mL)  naloxone Injectable  ondansetron Injectable  vancomycin  IVPB  BUpivacaine liposome 1.3% Injectable (no eMAR)  oxyCODONE  ER Tablet  oxyCODONE    IR  HYDROmorphone  Injectable  acetaminophen   Tablet ..  HYDROmorphone  Injectable  lactated ringers.  vancomycin  IVPB  HYDROmorphone  Injectable  HYDROmorphone PCA (1 mG/mL)  HYDROmorphone PCA (1 mG/mL) Rescue Clinician Bolus  naloxone Injectable  ondansetron Injectable  HYDROmorphone  Injectable  HYDROmorphone  Injectable  ALPRAZolam  HYDROmorphone PCA (1 mG/mL) Rescue Clinician Bolus  naloxone Injectable  sodium chloride 0.9% Bolus  norepinephrine Infusion  vancomycin  IVPB  cefepime   IVPB  cefepime   IVPB  cefepime   IVPB  magnesium sulfate  IVPB  acetaminophen  IVPB ..  LORazepam   Injectable  HYDROmorphone PCA (1 mG/mL)  naloxone Injectable  ondansetron Injectable  vancomycin  IVPB  vancomycin  IVPB  polyethylene glycol 3350  docusate sodium  senna  bisacodyl  vancomycin  IVPB  oxyCODONE    IR  oxyCODONE  ER Tablet  HYDROmorphone  Injectable  ALPRAZolam  sodium chloride 0.9% Bolus  vancomycin  IVPB  nicotine -   7 mG/24Hr(s) Patch  potassium chloride    Tablet ER  vancomycin  IVPB  oxyCODONE  ER Tablet  ALPRAZolam  oxyCODONE    IR  acetaminophen  IVPB ..  acetaminophen   Tablet ..  famotidine    Tablet      ROS: Const:  _-__febrile   Eyes:___ENT:___CV: _-__chest pain  Resp: __-__sob  GI:_-__nausea _-__vomiting _-__abd pain ___npo ___clears x__full diet __bm  :___ Musk: _x__pain _x__spasm  Skin:___ Neuro:  _-__xekscwkt_-__ywcajkfmc_-__ numbness _x__weakness _-__paresth  Psych:-__anxiety  Endo:___ Heme:___Allergy:_________, _x__all others reviewed and negative      Hemoglobin: 9.0 g/dL (01-14 @ 10:33)  Hemoglobin: 9.3 g/dL (01-13 @ 07:22)        T(C): 37 (01-14-19 @ 09:55), Max: 38.2 (01-13-19 @ 20:48)  HR: 96 (01-14-19 @ 09:55) (86 - 112)  BP: 102/66 (01-14-19 @ 09:55) (96/57 - 102/66)  RR: 16 (01-14-19 @ 09:55) (16 - 18)  SpO2: 100% (01-14-19 @ 09:55) (96% - 100%)  Wt(kg): --       PHYSICAL EXAM:  Gen Appearance: _x__no acute distress __x_appropriate        Neuro: ___SILT feet____ EOM Intact Psych: AAOX_3_, _x__mood/affect appropriate        Eyes: _x__conjunctiva WNL  __x___ Pupils equal and round        ENT: __x_ears and nose atraumatic_x__ Hearing grossly intact        Neck: _x__trachea midline, no visible masses ___thyroid without palpable mass    Resp: _x__Nml WOB____No tactile fremitus ___clear to auscultation    Cardio: _x__extremities free from edema __x__pedal pulses palpable    GI/Abdomen: _x__soft __x___ Nontender____x__Nondistended_____HSM    Lymphatic: ___no palpable nodes in neck  ___no palpable nodes calves and feet    Skin/Wound: _x__Incision, _x__Dressing c/d/i,   _x___surrounding tissues soft,  _x__drain/chest tube present____    Muscular: EHL _5__/5  Gastrocnemius_5__/5    ___absent clubbing/cyanosis        ASSESSMENT: This is a 53y old Male with a history of spinal stenosis and spinal fusion, s/p laminectomy fusion, post op day 7 , pain controlled with current pain medication regimen.       Recommended Treatment PLAN:  1. Oxycodone ER 80mg PO TID  2. Oxycodone IR 30mg PO Q4h prn severe pain  3. Dilaudid 1mg Q2h IVP prn breakthrough pain  Plan discussed with Vicente Feliz

## 2019-01-15 VITALS — DIASTOLIC BLOOD PRESSURE: 65 MMHG | HEART RATE: 95 BPM | SYSTOLIC BLOOD PRESSURE: 100 MMHG

## 2019-01-15 PROCEDURE — 80202 ASSAY OF VANCOMYCIN: CPT

## 2019-01-15 PROCEDURE — 72100 X-RAY EXAM L-S SPINE 2/3 VWS: CPT

## 2019-01-15 PROCEDURE — 86923 COMPATIBILITY TEST ELECTRIC: CPT

## 2019-01-15 PROCEDURE — 80053 COMPREHEN METABOLIC PANEL: CPT

## 2019-01-15 PROCEDURE — 73502 X-RAY EXAM HIP UNI 2-3 VIEWS: CPT

## 2019-01-15 PROCEDURE — 82803 BLOOD GASES ANY COMBINATION: CPT

## 2019-01-15 PROCEDURE — 85025 COMPLETE CBC W/AUTO DIFF WBC: CPT

## 2019-01-15 PROCEDURE — 74018 RADEX ABDOMEN 1 VIEW: CPT

## 2019-01-15 PROCEDURE — C1769: CPT

## 2019-01-15 PROCEDURE — 83735 ASSAY OF MAGNESIUM: CPT

## 2019-01-15 PROCEDURE — 87040 BLOOD CULTURE FOR BACTERIA: CPT

## 2019-01-15 PROCEDURE — 93312 ECHO TRANSESOPHAGEAL: CPT

## 2019-01-15 PROCEDURE — 80048 BASIC METABOLIC PNL TOTAL CA: CPT

## 2019-01-15 PROCEDURE — 97161 PT EVAL LOW COMPLEX 20 MIN: CPT

## 2019-01-15 PROCEDURE — 84484 ASSAY OF TROPONIN QUANT: CPT

## 2019-01-15 PROCEDURE — 76000 FLUOROSCOPY <1 HR PHYS/QHP: CPT

## 2019-01-15 PROCEDURE — 82550 ASSAY OF CK (CPK): CPT

## 2019-01-15 PROCEDURE — 81001 URINALYSIS AUTO W/SCOPE: CPT

## 2019-01-15 PROCEDURE — 97535 SELF CARE MNGMENT TRAINING: CPT

## 2019-01-15 PROCEDURE — 36415 COLL VENOUS BLD VENIPUNCTURE: CPT

## 2019-01-15 PROCEDURE — 97116 GAIT TRAINING THERAPY: CPT

## 2019-01-15 PROCEDURE — C1713: CPT

## 2019-01-15 PROCEDURE — 87641 MR-STAPH DNA AMP PROBE: CPT

## 2019-01-15 PROCEDURE — 82553 CREATINE MB FRACTION: CPT

## 2019-01-15 PROCEDURE — 93970 EXTREMITY STUDY: CPT

## 2019-01-15 PROCEDURE — 99233 SBSQ HOSP IP/OBS HIGH 50: CPT

## 2019-01-15 PROCEDURE — 93306 TTE W/DOPPLER COMPLETE: CPT

## 2019-01-15 PROCEDURE — 84100 ASSAY OF PHOSPHORUS: CPT

## 2019-01-15 PROCEDURE — C1889: CPT

## 2019-01-15 PROCEDURE — 86901 BLOOD TYPING SEROLOGIC RH(D): CPT

## 2019-01-15 PROCEDURE — 71045 X-RAY EXAM CHEST 1 VIEW: CPT

## 2019-01-15 PROCEDURE — 95940 IONM IN OPERATNG ROOM 15 MIN: CPT

## 2019-01-15 PROCEDURE — 85027 COMPLETE CBC AUTOMATED: CPT

## 2019-01-15 PROCEDURE — 83605 ASSAY OF LACTIC ACID: CPT

## 2019-01-15 PROCEDURE — 86850 RBC ANTIBODY SCREEN: CPT

## 2019-01-15 PROCEDURE — 86900 BLOOD TYPING SEROLOGIC ABO: CPT

## 2019-01-15 PROCEDURE — 82962 GLUCOSE BLOOD TEST: CPT

## 2019-01-15 PROCEDURE — 72170 X-RAY EXAM OF PELVIS: CPT

## 2019-01-15 PROCEDURE — 70450 CT HEAD/BRAIN W/O DYE: CPT

## 2019-01-15 RX ORDER — SENNA PLUS 8.6 MG/1
2 TABLET ORAL
Qty: 0 | Refills: 0 | COMMUNITY
Start: 2019-01-15

## 2019-01-15 RX ORDER — DOCUSATE SODIUM 100 MG
1 CAPSULE ORAL
Qty: 0 | Refills: 0 | COMMUNITY
Start: 2019-01-15

## 2019-01-15 RX ORDER — OXYCODONE HYDROCHLORIDE 5 MG/1
1 TABLET ORAL
Qty: 0 | Refills: 0 | COMMUNITY
Start: 2019-01-15

## 2019-01-15 RX ORDER — MORPHINE SULFATE 50 MG/1
1 CAPSULE, EXTENDED RELEASE ORAL
Qty: 0 | Refills: 0 | COMMUNITY

## 2019-01-15 RX ORDER — FAMOTIDINE 10 MG/ML
1 INJECTION INTRAVENOUS
Qty: 0 | Refills: 0 | COMMUNITY
Start: 2019-01-15

## 2019-01-15 RX ORDER — POLYETHYLENE GLYCOL 3350 17 G/17G
17 POWDER, FOR SOLUTION ORAL
Qty: 0 | Refills: 0 | COMMUNITY
Start: 2019-01-15

## 2019-01-15 RX ORDER — NICOTINE POLACRILEX 2 MG
0 GUM BUCCAL
Qty: 0 | Refills: 0 | COMMUNITY
Start: 2019-01-15

## 2019-01-15 RX ORDER — OXYCODONE HYDROCHLORIDE 5 MG/1
1 TABLET ORAL
Qty: 0 | Refills: 0 | COMMUNITY

## 2019-01-15 RX ADMIN — OXYCODONE HYDROCHLORIDE 80 MILLIGRAM(S): 5 TABLET ORAL at 00:35

## 2019-01-15 RX ADMIN — Medication 100 MILLIGRAM(S): at 13:12

## 2019-01-15 RX ADMIN — POLYETHYLENE GLYCOL 3350 17 GRAM(S): 17 POWDER, FOR SOLUTION ORAL at 12:33

## 2019-01-15 RX ADMIN — Medication 2 MILLIGRAM(S): at 12:33

## 2019-01-15 RX ADMIN — OXYCODONE HYDROCHLORIDE 30 MILLIGRAM(S): 5 TABLET ORAL at 11:00

## 2019-01-15 RX ADMIN — OXYCODONE HYDROCHLORIDE 30 MILLIGRAM(S): 5 TABLET ORAL at 01:37

## 2019-01-15 RX ADMIN — FAMOTIDINE 20 MILLIGRAM(S): 10 INJECTION INTRAVENOUS at 12:32

## 2019-01-15 RX ADMIN — OXYCODONE HYDROCHLORIDE 30 MILLIGRAM(S): 5 TABLET ORAL at 10:10

## 2019-01-15 RX ADMIN — OXYCODONE HYDROCHLORIDE 30 MILLIGRAM(S): 5 TABLET ORAL at 02:37

## 2019-01-15 RX ADMIN — OXYCODONE HYDROCHLORIDE 80 MILLIGRAM(S): 5 TABLET ORAL at 13:12

## 2019-01-15 RX ADMIN — OXYCODONE HYDROCHLORIDE 80 MILLIGRAM(S): 5 TABLET ORAL at 07:29

## 2019-01-15 NOTE — PROGRESS NOTE ADULT - ASSESSMENT
54yo M POD#4 s/p ROSA, exploration of fusion, revision laminectomy, primary laminectomy L2-L4, re-instrumentation of posterior spinal fusion L1-sacrum POD-7    1) Post-op state  - Pain control and bowel regimen.   - incentive spirometry  - OOB-C  - PT   * As per ID, stop all abx and monitor.     2) Somnolence likely related to pain meds.   -now resolved    3) MS -at baseline  * monitor    4) DVTppx  * SCDs and ambulation.     Dispo planning to Acute Rehab
52yo M POD#4 s/p ROSA, exploration of fusion, revision laminectomy, primary laminectomy L2-L4, re-instrumentation of posterior spinal fusion L1-sacrum POD-8    1) Post-op state  - Pain control and bowel regimen.   - incentive spirometry  - OOB-C  - PT     2) Somnolence likely related to pain meds.   -now resolved    3) MS -at baseline  * monitor    4) DVTppx  * SCDs and ambulation.     Dispo planning to Acute Rehab. Medically optimized
53y old Male with chronic pain history, Hep C, and h/o spinal stenosis and spinal fusion, POD1 s/p ROSA, exploration of fusion, revision lami, primary lami L2-L4, reinstrumentation of post spinal fusion L1-sacrum,c/b post-op hypotension, lethargy, and febrile admitted to SICU, concern for septic shock   Neuro: hold narcotics until patient is more arousable. pain control with IV tylenol prn.   CV: levo for MAP>65, LR@100; concern for endocarditis per cards, f/u CXR, echo ordered, f/u blood cultures  Pulm: [place]: NPO  : Tomasa  Endo: no issues  ID: vanc (1/7--), cefepime (1/7--) due to concern for endocarditis/ septic shock. Will consult ID for approval.   Ppx: SCDs  Lines: PIVs  Wounds: hemovac x 2
54yo M POD#4 s/p ROSA, exploration of fusion, revision laminectomy, primary laminectomy L2-L4, re-instrumentation of posterior spinal fusion L1-sacrum POD-4    1) Post-op state  * Pain control and bowel regimen.   * IS  * OOB-C  * PT evaluation.   * As per ID, stop all abx and monitor.     2) Somnolence likely related to pain meds.   * Monitor.   * Taper down pain meds.   * Followed by Pain Management.     3) MS -at baseline  * monitor    4) DVTppx  * SCDs and ambulation.
54yo M POD#4 s/p ROSA, exploration of fusion, revision laminectomy, primary laminectomy L2-L4, re-instrumentation of posterior spinal fusion L1-sacrum POD-5    1) Post-op state  - Pain control and bowel regimen.   - incentive spirometry  - OOB-C  - PT   * As per ID, stop all abx and monitor.     2) Somnolence likely related to pain meds.   -now resolved    3) MS -at baseline  * monitor    4) DVTppx  * SCDs and ambulation.
54yo M POD#4 s/p ROSA, exploration of fusion, revision laminectomy, primary laminectomy L2-L4, re-instrumentation of posterior spinal fusion L1-sacrum POD-6    1) Post-op state  - Pain control and bowel regimen.   - incentive spirometry  - OOB-C  - PT   * As per ID, stop all abx and monitor.     2) Somnolence likely related to pain meds.   -now resolved    3) MS -at baseline  * monitor    4) DVTppx  * SCDs and ambulation.
55 yo male s/p Lumar sacral revision , L2-4 laminectomy      somenlence  suspect due to opiates   cont PCA as per pain service  would restart xanax at 1mg bid (home dose is 2mg bid)  ok to feed    post op lumbar surgery  as per ortho  on vanc as per ID

## 2019-01-15 NOTE — PROGRESS NOTE ADULT - SUBJECTIVE AND OBJECTIVE BOX
Pain Management Progress Note - [facility] [phone]    HPI: Patient seen and examined today, patient laying down in bed, in no apparent distress. HIRA. Patient sitting in the bed, in no apparent distress. Patient Axox3, denies secondary side effects, no s/s of oversedation.    Pain is ___ sharp _x___dull ___burning _x__achy ___ Intensity: ____ mild _x__mod _x__severe     Location _x___surgical site ____cervical __x___lumbar ____abd ____upper ext____lower ext    Worse with __x__activity __x__movement _____physical therapy___ Rest    Improved with __x__medication __x__rest ____physical therapy      PAST MEDICAL & SURGICAL HISTORY:  Depression  MS (multiple sclerosis)  Hepatitis C  Surgery, elective: clavical sx  Surgery, elective: left ankle 2012  Surgery, elective: spinal fusion x 2  lumbar  Depression  COPD (chronic obstructive pulmonary disease)  MS (multiple sclerosis)  Hepatitis C  Spinal stenosis of lumbar region, unspecified whether neurogenic claudication present  Spinal fusion failure, sequela  Other kyphosis of thoracolumbar region  Spinal fusion failure, sequela  Spinal stenosis of lumbar region, unspecified whether neurogenic claudication present  Other kyphosis of thoracolumbar region  Laminectomy of lumbar spine 3 or more levels with exploration or decompression or both  Spinal stenosis of lumbosacral region  MS (multiple sclerosis)  Depression  Hepatitis C  Spinal stenosis of lumbosacral region  Laminectomy of lumbar spine 3 or more levels with exploration or decompression or both  Surgery, elective        BUpivacaine liposome 1.3% Injectable (no eMAR)  oxyCODONE  ER Tablet  oxyCODONE    IR  HYDROmorphone  Injectable  acetaminophen   Tablet ..  HYDROmorphone  Injectable  lactated ringers.  vancomycin  IVPB  HYDROmorphone  Injectable  HYDROmorphone PCA (1 mG/mL)  HYDROmorphone PCA (1 mG/mL) Rescue Clinician Bolus  naloxone Injectable  ondansetron Injectable  HYDROmorphone  Injectable  HYDROmorphone  Injectable  ALPRAZolam  HYDROmorphone PCA (1 mG/mL) Rescue Clinician Bolus  naloxone Injectable  sodium chloride 0.9% Bolus  norepinephrine Infusion  vancomycin  IVPB  cefepime   IVPB  magnesium sulfate  IVPB  acetaminophen  IVPB ..  LORazepam   Injectable  HYDROmorphone PCA (1 mG/mL)  naloxone Injectable  ondansetron Injectable  vancomycin  IVPB  vancomycin  IVPB  polyethylene glycol 3350  docusate sodium  senna  bisacodyl  vancomycin  IVPB  oxyCODONE    IR  oxyCODONE  ER Tablet  HYDROmorphone  Injectable  ALPRAZolam  sodium chloride 0.9% Bolus  vancomycin  IVPB  nicotine -   7 mG/24Hr(s) Patch  potassium chloride    Tablet ER  vancomycin  IVPB  oxyCODONE  ER Tablet  ALPRAZolam  oxyCODONE    IR  acetaminophen  IVPB ..  acetaminophen   Tablet ..  famotidine    Tablet        ROS: Const:  _-__febrile   Eyes:___ENT:___CV: _-__chest pain  Resp: __-__sob  GI:_-__nausea _-__vomiting _-__abd pain ___npo ___clears x__full diet __bm  :___ Musk: _x__pain _x__spasm  Skin:___ Neuro:  _-__fyyvivrq_-__kiprlkscw_-__ numbness _x__weakness _-__paresth  Psych:-__anxiety  Endo:___ Heme:___Allergy:_________, _x__all others reviewed and negative      Hemoglobin: 9.0 g/dL (01-14 @ 10:33)        T(C): 37 (01-15-19 @ 05:43), Max: 37 (01-14-19 @ 20:09)  HR: 95 (01-15-19 @ 07:28) (92 - 99)  BP: 100/65 (01-15-19 @ 07:28) (99/61 - 111/68)  RR: 18 (01-15-19 @ 05:43) (17 - 19)  SpO2: 95% (01-15-19 @ 05:43) (95% - 95%)  Wt(kg): --        PHYSICAL EXAM:  Gen Appearance: _x__no acute distress __x_appropriate        Neuro: ___SILT feet____ EOM Intact Psych: AAOX_3_, _x__mood/affect appropriate        Eyes: _x__conjunctiva WNL  __x___ Pupils equal and round        ENT: __x_ears and nose atraumatic_x__ Hearing grossly intact        Neck: _x__trachea midline, no visible masses ___thyroid without palpable mass    Resp: _x__Nml WOB____No tactile fremitus ___clear to auscultation    Cardio: _x__extremities free from edema __x__pedal pulses palpable    GI/Abdomen: _x__soft __x___ Nontender____x__Nondistended_____HSM    Lymphatic: ___no palpable nodes in neck  ___no palpable nodes calves and feet    Skin/Wound: _x__Incision, _x__Dressing c/d/i,   _x___surrounding tissues soft,  _x__drain/chest tube present____    Muscular: EHL _5__/5  Gastrocnemius_5__/5    ___absent clubbing/cyanosis        ASSESSMENT: This is a 53y old Male with a history of spinal stenosis and spinal fusion, s/p laminectomy fusion, post op day 8, pain controlled with current pain medication regimen.       Recommended Treatment PLAN:  1. Oxycodone ER 80mg PO TID  2. Oxycodone IR 30mg PO Q4h prn severe pain  3. Dilaudid 1mg Q2h IVP prn breakthrough pain  Plan discussed with Jose Eduardo Biggs

## 2019-01-15 NOTE — PROGRESS NOTE ADULT - PROVIDER SPECIALTY LIST ADULT
Cardiology
Cardiology
Internal Medicine
Orthopedics
Pain Medicine
SICU
Orthopedics
Orthopedics
Internal Medicine

## 2019-01-15 NOTE — PROGRESS NOTE ADULT - SUBJECTIVE AND OBJECTIVE BOX
Ortho Note    Pt comfortable without complaints, pain controlled. BP remains soft  Denies CP, SOB, N/V, numbness/tingling     Vital Signs Last 24 Hrs  T(C): --  T(F): --  HR: 95 (01-15-19 @ 07:28) (95 - 95)  BP: 100/65 (01-15-19 @ 07:28) (100/65 - 100/65)  BP(mean): --  RR: --  SpO2: --  AVSS    Awake, alert and oriented, NAD  Back DSG C/D/I  Pulses: 2+ DP/PT bilaterally   Sensation: s/s/sp/dp/t intact bilaterally   Motor: EHL/FHL/TA/GS 5/5 bilaterally  Bilateral LE mild swelling, toes WWP                          9.0    4.9   )-----------( 244      ( 14 Jan 2019 10:33 )             27.0   14 Jan 2019 10:33    138    |  100    |  9      ----------------------------<  125    4.4     |  28     |  0.80           A/P: 54M s/p ROSA, exploration of fusion, revision laminectomy, primary laminectomy L2-L4, re-instrumentation of posterior spinal fusion L1-sacrum   - Stable  - Pain Control  - DVT ppx: SCDs  - PT, WBS: WBAT  - dispo awaiting rehab    Ortho Pager [phone]

## 2019-01-15 NOTE — PROVIDER CONTACT NOTE (OTHER) - ACTION/TREATMENT ORDERED:
AD Guerrero notified.
No action order
Ns 1 L fluid bolus over 1 hour ordered and CBC and t & s  to be drawn
Yuri WEBSTER was notified of BP going down further and plan to call a rapid response- yuri WEBSTER agreed with such plan and will reportedly come to the unit ASAP.
X-Ray completed on 1/13/19 and patient was moved closer to nursing station to room 953.
assessed by MD at bedside, xray of back and pelvis.

## 2019-01-15 NOTE — PROGRESS NOTE ADULT - SUBJECTIVE AND OBJECTIVE BOX
CC: Feeling well. No complaints.   ROS negative.     Vital Signs Last 24 Hrs  T(C): 37 (15 Kristian 2019 05:43), Max: 37 (14 Jan 2019 20:09)  T(F): 98.6 (15 Kristian 2019 05:43), Max: 98.6 (14 Jan 2019 20:09)  HR: 95 (15 Kristian 2019 07:28) (92 - 99)  BP: 100/65 (15 Kristian 2019 07:28) (99/61 - 111/68)  BP(mean): --  RR: 18 (15 Kristian 2019 05:43) (17 - 19)  SpO2: 95% (15 Kristian 2019 05:43) (95% - 95%)    PHYSICAL EXAMINATION  * General: Not in acute distress. Awake and alert. Lying comfortably in bed.  * Neck: no JVD, supple.  * Lungs: Clear to auscultation, no rales, no wheezes.  * Cardio: Regular rate and rhythm, no murmurs, no rubs, no gallops. Good peripheral pulses.  * Abdomen: Soft, non-tender, non-distended, tympanic to percussion, no rebound, no guarding, no rigidity. Bowel sounds present. No suprapubic or CVA tenderness.  * Neuro: Alert and oriented x 3. No focal deficits. Motor strength is 5/5 throughout. Sensation intact. Cranial nerves II-XII grossly intact.     MEDICATIONS  (STANDING):  ALPRAZolam 2 milliGRAM(s) Oral two times a day  docusate sodium 100 milliGRAM(s) Oral three times a day  famotidine    Tablet 20 milliGRAM(s) Oral daily  nicotine -   7 mG/24Hr(s) Patch 1 patch Transdermal daily  oxyCODONE  ER Tablet 80 milliGRAM(s) Oral every 8 hours  polyethylene glycol 3350 17 Gram(s) Oral daily  senna 2 Tablet(s) Oral at bedtime    MEDICATIONS  (PRN):  bisacodyl 5 milliGRAM(s) Oral every 12 hours PRN Constipation  HYDROmorphone  Injectable 1 milliGRAM(s) IV Push every 2 hours PRN breakthrough pain  oxyCODONE    IR 30 milliGRAM(s) Oral every 4 hours PRN Severe Pain (7 - 10)

## 2019-01-15 NOTE — PROGRESS NOTE ADULT - REASON FOR ADMISSION
low back pain

## 2019-01-21 NOTE — PROVIDER CONTACT NOTE (OTHER) - SITUATION
S/P Fall on 1/13/19 on unit. When speaking to patient, patient had indicated he slipped and fell. However,  he stated to PCA that he intentionally made himself fall, for attention. Fever greater than 101/Bleeding that does not stop/Unable to Urinate

## 2019-01-23 DIAGNOSIS — F32.9 MAJOR DEPRESSIVE DISORDER, SINGLE EPISODE, UNSPECIFIED: ICD-10-CM

## 2019-01-23 DIAGNOSIS — M48.061 SPINAL STENOSIS, LUMBAR REGION WITHOUT NEUROGENIC CLAUDICATION: ICD-10-CM

## 2019-01-23 DIAGNOSIS — M48.07 SPINAL STENOSIS, LUMBOSACRAL REGION: ICD-10-CM

## 2019-01-23 DIAGNOSIS — Z98.1 ARTHRODESIS STATUS: ICD-10-CM

## 2019-01-23 DIAGNOSIS — T40.2X5A ADVERSE EFFECT OF OTHER OPIOIDS, INITIAL ENCOUNTER: ICD-10-CM

## 2019-01-23 DIAGNOSIS — I08.1 RHEUMATIC DISORDERS OF BOTH MITRAL AND TRICUSPID VALVES: ICD-10-CM

## 2019-01-23 DIAGNOSIS — M54.5 LOW BACK PAIN: ICD-10-CM

## 2019-01-23 DIAGNOSIS — M40.295 OTHER KYPHOSIS, THORACOLUMBAR REGION: ICD-10-CM

## 2019-01-23 DIAGNOSIS — J98.11 ATELECTASIS: ICD-10-CM

## 2019-01-23 DIAGNOSIS — Z79.891 LONG TERM (CURRENT) USE OF OPIATE ANALGESIC: ICD-10-CM

## 2019-01-23 DIAGNOSIS — F41.9 ANXIETY DISORDER, UNSPECIFIED: ICD-10-CM

## 2019-01-23 DIAGNOSIS — J44.9 CHRONIC OBSTRUCTIVE PULMONARY DISEASE, UNSPECIFIED: ICD-10-CM

## 2019-01-23 DIAGNOSIS — R91.8 OTHER NONSPECIFIC ABNORMAL FINDING OF LUNG FIELD: ICD-10-CM

## 2019-01-23 DIAGNOSIS — Z47.2 ENCOUNTER FOR REMOVAL OF INTERNAL FIXATION DEVICE: ICD-10-CM

## 2019-01-23 DIAGNOSIS — G89.29 OTHER CHRONIC PAIN: ICD-10-CM

## 2019-01-23 DIAGNOSIS — E87.1 HYPO-OSMOLALITY AND HYPONATREMIA: ICD-10-CM

## 2019-01-23 DIAGNOSIS — F17.210 NICOTINE DEPENDENCE, CIGARETTES, UNCOMPLICATED: ICD-10-CM

## 2019-01-23 DIAGNOSIS — R40.0 SOMNOLENCE: ICD-10-CM

## 2019-01-23 DIAGNOSIS — Z79.899 OTHER LONG TERM (CURRENT) DRUG THERAPY: ICD-10-CM

## 2019-01-23 DIAGNOSIS — G35 MULTIPLE SCLEROSIS: ICD-10-CM
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
## 2019-01-23 DIAGNOSIS — B18.2 CHRONIC VIRAL HEPATITIS C: ICD-10-CM

## 2019-01-23 DIAGNOSIS — I95.2 HYPOTENSION DUE TO DRUGS: ICD-10-CM

## 2019-01-23 DIAGNOSIS — Z86.69 PERSONAL HISTORY OF OTHER DISEASES OF THE NERVOUS SYSTEM AND SENSE ORGANS: ICD-10-CM

## 2019-01-23 DIAGNOSIS — D69.6 THROMBOCYTOPENIA, UNSPECIFIED: ICD-10-CM

## 2020-01-15 NOTE — OCCUPATIONAL THERAPY INITIAL EVALUATION ADULT - IMPAIRED TRANSFERS: SIT/STAND, REHAB EVAL
medical team medicine impaired postural control/decreased ROM/impaired motor control/decreased strength/impaired balance
